# Patient Record
Sex: MALE | Race: WHITE | NOT HISPANIC OR LATINO | URBAN - METROPOLITAN AREA
[De-identification: names, ages, dates, MRNs, and addresses within clinical notes are randomized per-mention and may not be internally consistent; named-entity substitution may affect disease eponyms.]

---

## 2017-12-20 ENCOUNTER — APPOINTMENT (RX ONLY)
Dept: URBAN - METROPOLITAN AREA CLINIC 142 | Facility: CLINIC | Age: 82
Setting detail: DERMATOLOGY
End: 2017-12-20

## 2017-12-20 DIAGNOSIS — L82.1 OTHER SEBORRHEIC KERATOSIS: ICD-10-CM

## 2017-12-20 DIAGNOSIS — D18.0 HEMANGIOMA: ICD-10-CM

## 2017-12-20 DIAGNOSIS — Z85.828 PERSONAL HISTORY OF OTHER MALIGNANT NEOPLASM OF SKIN: ICD-10-CM

## 2017-12-20 DIAGNOSIS — L57.0 ACTINIC KERATOSIS: ICD-10-CM

## 2017-12-20 PROBLEM — E78.5 HYPERLIPIDEMIA, UNSPECIFIED: Status: ACTIVE | Noted: 2017-12-20

## 2017-12-20 PROBLEM — D48.5 NEOPLASM OF UNCERTAIN BEHAVIOR OF SKIN: Status: ACTIVE | Noted: 2017-12-20

## 2017-12-20 PROBLEM — I10 ESSENTIAL (PRIMARY) HYPERTENSION: Status: ACTIVE | Noted: 2017-12-20

## 2017-12-20 PROBLEM — I48.91 UNSPECIFIED ATRIAL FIBRILLATION: Status: ACTIVE | Noted: 2017-12-20

## 2017-12-20 PROBLEM — D18.01 HEMANGIOMA OF SKIN AND SUBCUTANEOUS TISSUE: Status: ACTIVE | Noted: 2017-12-20

## 2017-12-20 PROBLEM — I63.50 CEREBRAL INFARCTION DUE TO UNSPECIFIED OCCLUSION OR STENOSIS OF UNSPECIFIED CEREBRAL ARTERY: Status: ACTIVE | Noted: 2017-12-20

## 2017-12-20 PROCEDURE — ? BIOPSY BY SHAVE METHOD

## 2017-12-20 PROCEDURE — ? LIQUID NITROGEN

## 2017-12-20 PROCEDURE — 99203 OFFICE O/P NEW LOW 30 MIN: CPT | Mod: 25

## 2017-12-20 PROCEDURE — 11100: CPT | Mod: 59

## 2017-12-20 PROCEDURE — ? COUNSELING

## 2017-12-20 PROCEDURE — 17003 DESTRUCT PREMALG LES 2-14: CPT

## 2017-12-20 PROCEDURE — 17000 DESTRUCT PREMALG LESION: CPT

## 2017-12-20 ASSESSMENT — LOCATION ZONE DERM
LOCATION ZONE: FACE
LOCATION ZONE: NOSE
LOCATION ZONE: TRUNK
LOCATION ZONE: SCALP

## 2017-12-20 ASSESSMENT — LOCATION DETAILED DESCRIPTION DERM
LOCATION DETAILED: RIGHT CENTRAL PARIETAL SCALP
LOCATION DETAILED: POSTERIOR MID-PARIETAL SCALP
LOCATION DETAILED: RIGHT MID-UPPER BACK
LOCATION DETAILED: RIGHT MEDIAL MALAR CHEEK
LOCATION DETAILED: NASAL DORSUM
LOCATION DETAILED: LEFT LATERAL ZYGOMA
LOCATION DETAILED: LEFT SUPERIOR UPPER BACK

## 2017-12-20 ASSESSMENT — LOCATION SIMPLE DESCRIPTION DERM
LOCATION SIMPLE: LEFT ZYGOMA
LOCATION SIMPLE: LEFT UPPER BACK
LOCATION SIMPLE: SCALP
LOCATION SIMPLE: RIGHT UPPER BACK
LOCATION SIMPLE: POSTERIOR SCALP
LOCATION SIMPLE: NOSE
LOCATION SIMPLE: RIGHT CHEEK

## 2017-12-20 ASSESSMENT — PAIN INTENSITY VAS: HOW INTENSE IS YOUR PAIN 0 BEING NO PAIN, 10 BEING THE MOST SEVERE PAIN POSSIBLE?: NO PAIN

## 2017-12-20 NOTE — HPI: NON-MELANOMA SKIN CANCER F/U (HISTORY OF NMSC)
What Is The Reason For Today's Visit?: Surveillance against skin cancer recurrences
How Many Skin Cancers Have You Had?: more than one
When Was Your Last Cancer Diagnosed?: 2004

## 2017-12-20 NOTE — PROCEDURE: BIOPSY BY SHAVE METHOD
Cryotherapy Text: The wound bed was treated with cryotherapy after the biopsy was performed.
Wound Care: Vaseline
Post-Care Instructions: I reviewed with the patient in detail post-care instructions. Patient is to keep the biopsy site dry overnight, and then apply vaseline twice daily until healed. Patient may apply water soaks to remove any crusting.
Curettage Text: The wound bed was treated with curettage after the biopsy was performed.
Hemostasis: Drysol
Type Of Destruction Used: Curettage
Biopsy Type: H and E
Lab Facility: [id number]
Electrodesiccation Text: The wound bed was treated with electrodesiccation after the biopsy was performed.
Bill [id number] For Specimen Handling/Conveyance To Laboratory?: no
Consent: Verbal consent was obtained and risks were reviewed including but not limited to scarring, infection, bleeding, scabbing, incomplete removal, nerve damage and allergy to anesthesia.
Detail Level: Detailed
Notification Instructions: Patient will be notified of biopsy results. However, patient instructed to call the office if not contacted within 4 weeks.
Anesthesia Volume In Cc (Will Not Render If 0): 1
Silver Nitrate Text: The wound bed was treated with silver nitrate after the biopsy was performed.
X Size Of Lesion In Cm: 0
Anesthesia Type: 1% lidocaine without epinephrine
Electrodesiccation And Curettage Text: The wound bed was treated with electrodesiccation and curettage after the biopsy was performed.
Dressing: pressure dressing with telfa
Lab: [id number]
Biopsy Method: Dermablade
Billing Type: Third-Party Bill
Render Post-Care Instructions In Note?: yes

## 2017-12-20 NOTE — PROCEDURE: LIQUID NITROGEN
Total Number Of Aks Treated: 3
Render Post-Care Instructions In Note?: no
Detail Level: Detailed
Post-Care Instructions: I reviewed with the patient in detail post-care instructions. Patient is to wear sunprotection, and avoid picking at any of the treated lesions. Pt may apply Vaseline to crusted or scabbing areas.
Consent: The patient's consent was obtained including but not limited to risks of crusting, scabbing, blistering, scarring, darker or lighter pigmentary change, recurrence, incomplete removal and infection.
Duration Of Freeze Thaw-Cycle (Seconds): 0

## 2018-01-29 ENCOUNTER — APPOINTMENT (RX ONLY)
Dept: URBAN - METROPOLITAN AREA SURGERY CENTER 11 | Facility: SURGERY CENTER | Age: 83
Setting detail: DERMATOLOGY
End: 2018-01-29

## 2018-01-29 VITALS
HEART RATE: 75 BPM | OXYGEN SATURATION: 98 % | DIASTOLIC BLOOD PRESSURE: 84 MMHG | SYSTOLIC BLOOD PRESSURE: 152 MMHG | WEIGHT: 140 LBS | HEIGHT: 66 IN

## 2018-01-29 PROBLEM — D04.39 CARCINOMA IN SITU OF SKIN OF OTHER PARTS OF FACE: Status: ACTIVE | Noted: 2018-01-29

## 2018-01-29 PROCEDURE — 17312 MOHS ADDL STAGE: CPT

## 2018-01-29 PROCEDURE — 13132 CMPLX RPR F/C/C/M/N/AX/G/H/F: CPT

## 2018-01-29 PROCEDURE — ? MOHS SURGERY

## 2018-01-29 PROCEDURE — ? PRESCRIPTION

## 2018-01-29 PROCEDURE — 17311 MOHS 1 STAGE H/N/HF/G: CPT

## 2018-01-29 RX ORDER — CEPHALEXIN 250 MG/1
CAPSULE ORAL
Qty: 28 | Refills: 0 | Status: ERX | COMMUNITY
Start: 2018-01-29

## 2018-01-29 RX ADMIN — CEPHALEXIN: 250 CAPSULE ORAL at 14:56

## 2018-01-29 NOTE — PROCEDURE: MOHS SURGERY
Simple / Intermediate / Complex Repair - Final Wound Length In Cm: 4.2
Ftsg Text: The defect edges were debeveled with a #15 scalpel blade.  Given the location of the defect, shape of the defect and the proximity to free margins a full thickness skin graft was deemed most appropriate.  Using a sterile surgical marker, the primary defect shape was transferred to the donor site. The area thus outlined was incised deep to adipose tissue with a #15 scalpel blade.  The harvested graft was then trimmed of adipose tissue until only dermis and epidermis was left.  The skin margins of the secondary defect were undermined to an appropriate distance in all directions utilizing iris scissors.  The secondary defect was closed with interrupted buried subcutaneous sutures.  The skin edges were then re-apposed with running  sutures.  The skin graft was then placed in the primary defect and oriented appropriately.
Helical Rim Advancement Flap Text: The defect edges were debeveled with a #15 blade scalpel.  Given the location of the defect and the proximity to free margins (helical rim) a double helical rim advancement flap was deemed most appropriate.  Using a sterile surgical marker, the appropriate advancement flaps were drawn incorporating the defect and placing the expected incisions between the helical rim and antihelix where possible.  The area thus outlined was incised through and through with a #15 scalpel blade.  With a skin hook and iris scissors, the flaps were gently and sharply undermined and freed up.
Consent Type: Consent 1 (Standard)
Asa Classification: II
Depth Of Tumor Invasion (For Histology): epidermis
Stage 6: Additional Anesthesia Volume In Cc: 0
Anesthesia Type: 1% lidocaine with 1:100,000 epinephrine and a 1:10 solution of 8.4% sodium bicarbonate
Use Subsequent Stages Verbiage?: Yes
Stage 8: Additional Anesthesia Type: 1% lidocaine with epinephrine
Bill For Surgical Tray: no
Rhombic Flap Text: The defect edges were debeveled with a #15 scalpel blade.  Given the location of the defect and the proximity to free margins a rhombic flap was deemed most appropriate.  Using a sterile surgical marker, an appropriate rhombic flap was drawn incorporating the defect.    The area thus outlined was incised deep to adipose tissue with a #15 scalpel blade.  The skin margins were undermined to an appropriate distance in all directions utilizing iris scissors.
Trilobed Flap Text: The defect edges were debeveled with a #15 scalpel blade.  Given the location of the defect and the proximity to free margins a trilobed flap was deemed most appropriate.  Using a sterile surgical marker, an appropriate trilobed flap drawn around the defect.    The area thus outlined was incised deep to adipose tissue with a #15 scalpel blade.  The skin margins were undermined to an appropriate distance in all directions utilizing iris scissors.
Alar Island Pedicle Flap Text: The defect edges were debeveled with a #15 scalpel blade.  Given the location of the defect, shape of the defect and the proximity to the alar rim an island pedicle advancement flap was deemed most appropriate.  Using a sterile surgical marker, an appropriate advancement flap was drawn incorporating the defect, outlining the appropriate donor tissue and placing the expected incisions within the nasal ala running parallel to the alar rim. The area thus outlined was incised with a #15 scalpel blade.  The skin margins were undermined minimally to an appropriate distance in all directions around the primary defect and laterally outward around the island pedicle utilizing iris scissors.  There was minimal undermining beneath the pedicle flap.
Surgeon/Pathologist Verbiage (Will Incorporate Name Of Surgeon From Intro If Not Blank): operated in two distinct and integrated capacities as the surgeon and pathologist.
Purse String (Simple) Text: Given the location of the defect and the characteristics of the surrounding skin a pursestring closure was deemed most appropriate.  Undermining was performed circumfirentially around the surgical defect.  A purstring suture was then placed and tightened.
Tarsorrhaphy Text: A tarsorrhaphy was performed using Frost sutures.
W Plasty Text: The lesion was extirpated to the level of the fat with a #15 scalpel blade.  Given the location of the defect, shape of the defect and the proximity to free margins a W-plasty was deemed most appropriate for repair.  Using a sterile surgical marker, the appropriate transposition arms of the W-plasty were drawn incorporating the defect and placing the expected incisions within the relaxed skin tension lines where possible.    The area thus outlined was incised deep to adipose tissue with a #15 scalpel blade.  The skin margins were undermined to an appropriate distance in all directions utilizing iris scissors.  The opposing transposition arms were then transposed into place in opposite direction and anchored with interrupted buried subcutaneous sutures.
Melolabial Interpolation Flap Text: A decision was made to reconstruct the defect utilizing an interpolation axial flap and a staged reconstruction.  A telfa template was made of the defect.  This telfa template was then used to outline the melolabial interpolation flap.  The donor area for the pedicle flap was then injected with anesthesia.  The flap was excised through the skin and subcutaneous tissue down to the layer of the underlying musculature.  The pedicle flap was carefully excised within this deep plane to maintain its blood supply.  The edges of the donor site were undermined.   The donor site was closed in a primary fashion.  The pedicle was then rotated into position and sutured.  Once the tube was sutured into place, adequate blood supply was confirmed with blanching and refill.  The pedicle was then wrapped with xeroform gauze and dressed appropriately with a telfa and gauze bandage to ensure continued blood supply and protect the attached pedicle.
Repair Performed By Another Provider Text (Leave Blank If You Do Not Want): After obtaining clear surgical margins the defect was repaired by another provider.
Consent 3/Introductory Paragraph: I gave the patient a chance to ask questions they had about the procedure.  Following this I explained the Mohs procedure and consent was obtained. The risks, benefits and alternatives to therapy were discussed in detail. Specifically, the risks of infection, scarring, bleeding, prolonged wound healing, incomplete removal, allergy to anesthesia, nerve injury and recurrence were addressed. Prior to the procedure, the treatment site was clearly identified and confirmed by the patient. All components of Universal Protocol/PAUSE Rule completed.
No Repair - Repaired With Adjacent Surgical Defect Text (Leave Blank If You Do Not Want): After obtaining clear surgical margins the defect was repaired concurrently with another surgical defect which was in close approximation.
Dermal Autograft Text: The defect edges were debeveled with a #15 scalpel blade.  Given the location of the defect, shape of the defect and the proximity to free margins a dermal autograft was deemed most appropriate.  Using a sterile surgical marker, the primary defect shape was transferred to the donor site. The area thus outlined was incised deep to adipose tissue with a #15 scalpel blade.  The harvested graft was then trimmed of adipose and epidermal tissue until only dermis was left.  The skin graft was then placed in the primary defect and oriented appropriately.
Number Of Stages: 2
Wound Care: Vaseline
Lazy S Intermediate Repair Preamble Text (Leave Blank If You Do Not Want): Undermining was performed with blunt dissection.
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
Consent (Nose)/Introductory Paragraph: The rationale for Mohs was explained to the patient and consent was obtained. The risks, benefits and alternatives to therapy were discussed in detail. Specifically, the risks of nasal deformity, changes in the flow of air through the nose, infection, scarring, bleeding, prolonged wound healing, incomplete removal, allergy to anesthesia, nerve injury and recurrence were addressed. Prior to the procedure, the treatment site was clearly identified and confirmed by the patient. All components of Universal Protocol/PAUSE Rule completed.
Consent (Scalp)/Introductory Paragraph: The rationale for Mohs was explained to the patient and consent was obtained. The risks, benefits and alternatives to therapy were discussed in detail. Specifically, the risks of changes in hair growth pattern secondary to repair, infection, scarring, bleeding, prolonged wound healing, incomplete removal, allergy to anesthesia, nerve injury and recurrence were addressed. Prior to the procedure, the treatment site was clearly identified and confirmed by the patient. All components of Universal Protocol/PAUSE Rule completed.
Island Pedicle Flap Text: The defect edges were debeveled with a #15 scalpel blade.  Given the location of the defect, shape of the defect and the proximity to free margins an island pedicle advancement flap was deemed most appropriate.  Using a sterile surgical marker, an appropriate advancement flap was drawn incorporating the defect, outlining the appropriate donor tissue and placing the expected incisions within the relaxed skin tension lines where possible.    The area thus outlined was incised deep to adipose tissue with a #15 scalpel blade.  The skin margins were undermined to an appropriate distance in all directions around the primary defect and laterally outward around the island pedicle utilizing iris scissors.  There was minimal undermining beneath the pedicle flap.
Time Everyone Was In The Room/Time Out: 3040
Spiral Flap Text: The defect edges were debeveled with a #15 scalpel blade.  Given the location of the defect, shape of the defect and the proximity to free margins a spiral flap was deemed most appropriate.  Using a sterile surgical marker, an appropriate rotation flap was drawn incorporating the defect and placing the expected incisions within the relaxed skin tension lines where possible. The area thus outlined was incised deep to adipose tissue with a #15 scalpel blade.  The skin margins were undermined to an appropriate distance in all directions utilizing iris scissors.
Paramedian Forehead Flap Text: A decision was made to reconstruct the defect utilizing an interpolation axial flap and a staged reconstruction.  A telfa template was made of the defect.  This telfa template was then used to outline the paramedian forehead pedicle flap.  The donor area for the pedicle flap was then injected with anesthesia.  The flap was excised through the skin and subcutaneous tissue down to the layer of the underlying musculature.  The pedicle flap was carefully excised within this deep plane to maintain its blood supply.  The edges of the donor site were undermined.   The donor site was closed in a primary fashion.  The pedicle was then rotated into position and sutured.  Once the tube was sutured into place, adequate blood supply was confirmed with blanching and refill.  The pedicle was then wrapped with xeroform gauze and dressed appropriately with a telfa and gauze bandage to ensure continued blood supply and protect the attached pedicle.
V-Y Flap Text: The defect edges were debeveled with a #15 scalpel blade.  Given the location of the defect, shape of the defect and the proximity to free margins a V-Y flap was deemed most appropriate.  Using a sterile surgical marker, an appropriate advancement flap was drawn incorporating the defect and placing the expected incisions within the relaxed skin tension lines where possible.    The area thus outlined was incised deep to adipose tissue with a #15 scalpel blade.  The skin margins were undermined to an appropriate distance in all directions utilizing iris scissors.
Cheek Interpolation Flap Text: A decision was made to reconstruct the defect utilizing an interpolation axial flap and a staged reconstruction.  A telfa template was made of the defect.  This telfa template was then used to outline the Cheek Interpolation flap.  The donor area for the pedicle flap was then injected with anesthesia.  The flap was excised through the skin and subcutaneous tissue down to the layer of the underlying musculature.  The interpolation flap was carefully excised within this deep plane to maintain its blood supply.  The edges of the donor site were undermined.   The donor site was closed in a primary fashion.  The pedicle was then rotated into position and sutured.  Once the tube was sutured into place, adequate blood supply was confirmed with blanching and refill.  The pedicle was then wrapped with xeroform gauze and dressed appropriately with a telfa and gauze bandage to ensure continued blood supply and protect the attached pedicle.
Closure 4 Information: This tab is for additional flaps and grafts above and beyond our usual structured repairs.  Please note if you enter information here it will not currently bill and you will need to add the billing information manually.
Eye Protection Verbiage: Before proceeding with the stage, a plastic scleral shield was inserted. The globe was anesthetized with a few drops of 1% lidocaine with 1:100,000 epinephrine. Then, an appropriate sized scleral shield was chosen and coated with lacrilube ointment. The shield was gently inserted and left in place for the duration of each stage. After the stage was completed, the shield was gently removed.
Anesthesia Volume In Cc: 2.5
S Plasty Text: Given the location and shape of the defect, and the orientation of relaxed skin tension lines, an S-plasty was deemed most appropriate for repair.  Using a sterile surgical marker, the appropriate outline of the S-plasty was drawn, incorporating the defect and placing the expected incisions within the relaxed skin tension lines where possible.  The area thus outlined was incised deep to adipose tissue with a #15 scalpel blade.  The skin margins were undermined to an appropriate distance in all directions utilizing iris scissors. The skin flaps were advanced over the defect.  The opposing margins were then approximated with interrupted buried subcutaneous sutures.
Stage 1: Number Of Blocks?: 1
Time Patient Discharged: 0242
Complex Repair And Graft Additional Text (Will Appearing After The Standard Complex Repair Text): The complex repair was not sufficient to completely close the primary defect. The remaining additional defect was repaired with the graft mentioned below.
Lazy S Complex Repair Preamble Text (Leave Blank If You Do Not Want): Extensive wide undermining was performed.
Cartilage Graft Text: The defect edges were debeveled with a #15 scalpel blade.  Given the location of the defect, shape of the defect, the fact the defect involved a full thickness cartilage defect a cartilage graft was deemed most appropriate.  An appropriate donor site was identified, cleansed, and anesthetized. The cartilage graft was then harvested and transferred to the recipient site, oriented appropriately and then sutured into place.  The secondary defect was then repaired using a primary closure.
Burow's Advancement Flap Text: The defect edges were debeveled with a #15 scalpel blade.  Given the location of the defect and the proximity to free margins a Burow's advancement flap was deemed most appropriate.  Using a sterile surgical marker, the appropriate advancement flap was drawn incorporating the defect and placing the expected incisions within the relaxed skin tension lines where possible.    The area thus outlined was incised deep to adipose tissue with a #15 scalpel blade.  The skin margins were undermined to an appropriate distance in all directions utilizing iris scissors.
Island Pedicle Flap-Requiring Vessel Identification Text: The defect edges were debeveled with a #15 scalpel blade.  Given the location of the defect, shape of the defect and the proximity to free margins an island pedicle advancement flap was deemed most appropriate.  Using a sterile surgical marker, an appropriate advancement flap was drawn, based on the axial vessel mentioned above, incorporating the defect, outlining the appropriate donor tissue and placing the expected incisions within the relaxed skin tension lines where possible.    The area thus outlined was incised deep to adipose tissue with a #15 scalpel blade.  The skin margins were undermined to an appropriate distance in all directions around the primary defect and laterally outward around the island pedicle utilizing iris scissors.  There was minimal undermining beneath the pedicle flap.
Epidermal Sutures: 6-0 Ethilon
Transposition Flap Text: The defect edges were debeveled with a #15 scalpel blade.  Given the location of the defect and the proximity to free margins a transposition flap was deemed most appropriate.  Using a sterile surgical marker, an appropriate transposition flap was drawn incorporating the defect.    The area thus outlined was incised deep to adipose tissue with a #15 scalpel blade.  The skin margins were undermined to an appropriate distance in all directions utilizing iris scissors.
Perineural Invasion (For Histology - Be Specific If Possible): absent
Hemostasis: Electrocautery
O-L Flap Text: The defect edges were debeveled with a #15 scalpel blade.  Given the location of the defect, shape of the defect and the proximity to free margins an O-L flap was deemed most appropriate.  Using a sterile surgical marker, an appropriate advancement flap was drawn incorporating the defect and placing the expected incisions within the relaxed skin tension lines where possible.    The area thus outlined was incised deep to adipose tissue with a #15 scalpel blade.  The skin margins were undermined to an appropriate distance in all directions utilizing iris scissors.
Time Procedure Ended: 2732
Donor Site Anesthesia Type: same as repair anesthesia
Composite Graft Text: The defect edges were debeveled with a #15 scalpel blade.  Given the location of the defect, shape of the defect, the proximity to free margins and the fact the defect was full thickness a composite graft was deemed most appropriate.  The defect was outline and then transferred to the donor site.  A full thickness graft was then excised from the donor site. The graft was then placed in the primary defect, oriented appropriately and then sutured into place.  The secondary defect was then repaired using a primary closure.
Referred To Otolaryngology For Closure Text (Leave Blank If You Do Not Want): After obtaining clear surgical margins the patient was sent to otolaryngology for surgical repair.  The patient understands they will receive post-surgical care and follow-up from the referring physician's office.
Dressing: pressure dressing with telfa
Island Pedicle Flap With Canthal Suspension Text: The defect edges were debeveled with a #15 scalpel blade.  Given the location of the defect, shape of the defect and the proximity to free margins an island pedicle advancement flap was deemed most appropriate.  Using a sterile surgical marker, an appropriate advancement flap was drawn incorporating the defect, outlining the appropriate donor tissue and placing the expected incisions within the relaxed skin tension lines where possible. The area thus outlined was incised deep to adipose tissue with a #15 scalpel blade.  The skin margins were undermined to an appropriate distance in all directions around the primary defect and laterally outward around the island pedicle utilizing iris scissors.  There was minimal undermining beneath the pedicle flap. A suspension suture was placed in the canthal tendon to prevent tension and prevent ectropion.
Suture Removal: 7 days
Interpolation Flap Text: A decision was made to reconstruct the defect utilizing an interpolation axial flap and a staged reconstruction.  A telfa template was made of the defect.  This telfa template was then used to outline the interpolation flap.  The donor area for the pedicle flap was then injected with anesthesia.  The flap was excised through the skin and subcutaneous tissue down to the layer of the underlying musculature.  The interpolation flap was carefully excised within this deep plane to maintain its blood supply.  The edges of the donor site were undermined.   The donor site was closed in a primary fashion.  The pedicle was then rotated into position and sutured.  Once the tube was sutured into place, adequate blood supply was confirmed with blanching and refill.  The pedicle was then wrapped with xeroform gauze and dressed appropriately with a telfa and gauze bandage to ensure continued blood supply and protect the attached pedicle.
Modified Advancement Flap Text: The defect edges were debeveled with a #15 scalpel blade.  Given the location of the defect, shape of the defect and the proximity to free margins a modified advancement flap was deemed most appropriate.  Using a sterile surgical marker, an appropriate advancement flap was drawn incorporating the defect and placing the expected incisions within the relaxed skin tension lines where possible.    The area thus outlined was incised deep to adipose tissue with a #15 scalpel blade.  The skin margins were undermined to an appropriate distance in all directions utilizing iris scissors.
Partial Purse String (Simple) Text: Given the location of the defect and the characteristics of the surrounding skin a simple purse string closure was deemed most appropriate.  Undermining was performed circumfirentially around the surgical defect.  A purse string suture was then placed and tightened. Wound tension only allowed a partial closure of the circular defect.
Bilobed Flap Text: The defect edges were debeveled with a #15 scalpel blade.  Given the location of the defect and the proximity to free margins a bilobe flap was deemed most appropriate.  Using a sterile surgical marker, an appropriate bilobe flap drawn around the defect.    The area thus outlined was incised deep to adipose tissue with a #15 scalpel blade.  The skin margins were undermined to an appropriate distance in all directions utilizing iris scissors.
Detail Level: Detailed
Closure 2 Information: This tab is for additional flaps and grafts, including complex repair and grafts and complex repair and flaps. You can also specify a different location for the additional defect, if the location is the same you do not need to select a new one. We will insert the automated text for the repair you select below just as we do for solitary flaps and grafts. Please note that at this time if you select a location with a different insurance zone you will need to override the ICD10 and CPT if appropriate.
Consent (Marginal Mandibular)/Introductory Paragraph: The rationale for Mohs was explained to the patient and consent was obtained. The risks, benefits and alternatives to therapy were discussed in detail. Specifically, the risks of damage to the marginal mandibular branch of the facial nerve, infection, scarring, bleeding, prolonged wound healing, incomplete removal, allergy to anesthesia, and recurrence were addressed. Prior to the procedure, the treatment site was clearly identified and confirmed by the patient. All components of Universal Protocol/PAUSE Rule completed.
Area L Indication Text: Tumors in this location are included in Area L (trunk and extremities).  Mohs surgery is indicated for larger tumors, or tumors with aggressive histologic features, in these anatomic locations.
A-T Advancement Flap Text: The defect edges were debeveled with a #15 scalpel blade.  Given the location of the defect, shape of the defect and the proximity to free margins an A-T advancement flap was deemed most appropriate.  Using a sterile surgical marker, an appropriate advancement flap was drawn incorporating the defect and placing the expected incisions within the relaxed skin tension lines where possible.    The area thus outlined was incised deep to adipose tissue with a #15 scalpel blade.  The skin margins were undermined to an appropriate distance in all directions utilizing iris scissors.
Alternatives Discussed Intro (Do Not Add Period): I discussed alternative treatments to Mohs surgery and specifically discussed the risks and benefits of
Repair Type: Complex Repair
Subsequent Stages Histo Method Verbiage: Using a similar technique to that described above, a thin layer of tissue was removed from all areas where tumor was visible on the previous stage.  The tissue was again oriented, mapped, dyed, and processed as above.
Estimated Blood Loss (Cc): minimal
O-T Plasty Text: The defect edges were debeveled with a #15 scalpel blade.  Given the location of the defect, shape of the defect and the proximity to free margins an O-T plasty was deemed most appropriate.  Using a sterile surgical marker, an appropriate O-T plasty was drawn incorporating the defect and placing the expected incisions within the relaxed skin tension lines where possible.    The area thus outlined was incised deep to adipose tissue with a #15 scalpel blade.  The skin margins were undermined to an appropriate distance in all directions utilizing iris scissors.
Referred To Asc For Closure Text (Leave Blank If You Do Not Want): After obtaining clear surgical margins the patient was sent to an ASC for surgical repair.  The patient understands they will receive post-surgical care and follow-up from the ASC physician.
Initial Size Of Lesion: 1.3
Ear Wedge Repair Text: A wedge excision was completed by carrying down an excision through the full thickness of the ear and cartilage with an inward facing Burow's triangle. The wound was then closed in a layered fashion.
H Plasty Text: Given the location of the defect, shape of the defect and the proximity to free margins a H-plasty was deemed most appropriate for repair.  Using a sterile surgical marker, the appropriate advancement arms of the H-plasty were drawn incorporating the defect and placing the expected incisions within the relaxed skin tension lines where possible. The area thus outlined was incised deep to adipose tissue with a #15 scalpel blade. The skin margins were undermined to an appropriate distance in all directions utilizing iris scissors.  The opposing advancement arms were then advanced into place in opposite direction and anchored with interrupted buried subcutaneous sutures.
Area H Indication Text: Tumors in this location are included in Area H (eyelids, eyebrows, nose, lips, chin, ear, pre-auricular, post-auricular, temple, genitalia, hands, feet, ankles and areola).  Tissue conservation is critical in these anatomic locations.
Hatchet Flap Text: The defect edges were debeveled with a #15 scalpel blade.  Given the location of the defect, shape of the defect and the proximity to free margins a hatchet flap was deemed most appropriate.  Using a sterile surgical marker, an appropriate hatchet flap was drawn incorporating the defect and placing the expected incisions within the relaxed skin tension lines where possible.    The area thus outlined was incised deep to adipose tissue with a #15 scalpel blade.  The skin margins were undermined to an appropriate distance in all directions utilizing iris scissors.
Mohs Method Verbiage: An incision at a 45 degree angle following the standard Mohs approach was done and the specimen was harvested as a microscopic controlled layer.
Localized Dermabrasion Text: The patient was draped in routine manner.  Localized dermabrasion using 3 x 17 mm wire brush was performed in routine manner to papillary dermis. This spot dermabrasion is being performed to complete skin cancer reconstruction. It also will eliminate the other sun damaged precancerous cells that are known to be part of the regional effect of a lifetime's worth of sun exposure. This localized dermabrasion is therapeutic and should not be considered cosmetic in any regard.
Purse String (Intermediate) Text: Given the location of the defect and the characteristics of the surrounding skin a pursestring intermediate closure was deemed most appropriate.  Undermining was performed circumfirentially around the surgical defect.  A purstring suture was then placed and tightened.
Mastoid Interpolation Flap Text: A decision was made to reconstruct the defect utilizing an interpolation axial flap and a staged reconstruction.  A telfa template was made of the defect.  This telfa template was then used to outline the mastoid interpolation flap.  The donor area for the pedicle flap was then injected with anesthesia.  The flap was excised through the skin and subcutaneous tissue down to the layer of the underlying musculature.  The pedicle flap was carefully excised within this deep plane to maintain its blood supply.  The edges of the donor site were undermined.   The donor site was closed in a primary fashion.  The pedicle was then rotated into position and sutured.  Once the tube was sutured into place, adequate blood supply was confirmed with blanching and refill.  The pedicle was then wrapped with xeroform gauze and dressed appropriately with a telfa and gauze bandage to ensure continued blood supply and protect the attached pedicle.
Cheiloplasty (Less Than 50%) Text: A decision was made to reconstruct the defect with a  cheiloplasty.  The defect was undermined extensively.  Additional obicularis oris muscle was excised with a 15 blade scalpel.  The defect was converted into a full thickness wedge, of less than 50% of the vertical height of the lip, to facilite a better cosmetic result.  Small vessels were then tied off with 5-0 monocyrl. The obicularis oris, superficial fascia, adipose and dermis were then reapproximated.  After the deeper layers were approximated the epidermis was reapproximated with particular care given to realign the vermillion border.
Secondary Intention Text (Leave Blank If You Do Not Want): The defect will heal with secondary intention.
Graft Donor Site Bandage (Optional-Leave Blank If You Don't Want In Note): Steri-strips and a pressure bandage were applied to the donor site.
Complex Repair Preamble Text (Leave Blank If You Do Not Want): The defect edges were debeveled with a #15 scalpel blade. Given the location of the defect a complex repair was deemed most appropriate.  Using a sterile surgical marker, burrow triangles were drawn incorporating the defect and placing the expected incisions within the relaxed skin tension lines where possible.    The area thus outlined was incised to the appropriate tissue plane with a scalpel blade. Extensive wide undermining was performed in all directions to allow proper closure of the defect.  Hemostasis was obtained by cautery.
Consent 2/Introductory Paragraph: Mohs surgery was explained to the patient and consent was obtained. The risks, benefits and alternatives to therapy were discussed in detail. Specifically, the risks of infection, scarring, bleeding, prolonged wound healing, incomplete removal, allergy to anesthesia, nerve injury and recurrence were addressed. Prior to the procedure, the treatment site was clearly identified and confirmed by the patient. All components of Universal Protocol/PAUSE Rule completed.
Consent (Near Eyelid Margin)/Introductory Paragraph: The rationale for Mohs was explained to the patient and consent was obtained. The risks, benefits and alternatives to therapy were discussed in detail. Specifically, the risks of ectropion or eyelid deformity, infection, scarring, bleeding, prolonged wound healing, incomplete removal, allergy to anesthesia, nerve injury and recurrence were addressed. Prior to the procedure, the treatment site was clearly identified and confirmed by the patient. All components of Universal Protocol/PAUSE Rule completed.
Bi-Rhombic Flap Text: The defect edges were debeveled with a #15 scalpel blade.  Given the location of the defect and the proximity to free margins a bi-rhombic flap was deemed most appropriate.  Using a sterile surgical marker, an appropriate rhombic flap was drawn incorporating the defect. The area thus outlined was incised deep to adipose tissue with a #15 scalpel blade.  The skin margins were undermined to an appropriate distance in all directions utilizing iris scissors.
Rotation Flap Text: The defect edges were debeveled with a #15 scalpel blade.  Given the location of the defect, shape of the defect and the proximity to free margins a rotation flap was deemed most appropriate.  Using a sterile surgical marker, an appropriate rotation flap was drawn incorporating the defect and placing the expected incisions within the relaxed skin tension lines where possible.    The area thus outlined was incised deep to adipose tissue with a #15 scalpel blade.  The skin margins were undermined to an appropriate distance in all directions utilizing iris scissors.
Consent (Lip)/Introductory Paragraph: The rationale for Mohs was explained to the patient and consent was obtained. The risks, benefits and alternatives to therapy were discussed in detail. Specifically, the risks of lip deformity, changes in the oral aperture, infection, scarring, bleeding, prolonged wound healing, incomplete removal, allergy to anesthesia, nerve injury and recurrence were addressed. Prior to the procedure, the treatment site was clearly identified and confirmed by the patient. All components of Universal Protocol/PAUSE Rule completed.
X Size Of Lesion In Cm (Optional): 1.1
Advancement Flap (Single) Text: The defect edges were debeveled with a #15 scalpel blade.  Given the location of the defect and the proximity to free margins a single advancement flap was deemed most appropriate.  Using a sterile surgical marker, an appropriate advancement flap was drawn incorporating the defect and placing the expected incisions within the relaxed skin tension lines where possible.    The area thus outlined was incised deep to adipose tissue with a #15 scalpel blade.  The skin margins were undermined to an appropriate distance in all directions utilizing iris scissors.
Mohs Histo Method Verbiage: Each section was then chromacoded and processed in the Mohs lab using the Mohs protocol and submitted for frozen section.
Z Plasty Text: The lesion was extirpated to the level of the fat with a #15 scalpel blade.  Given the location of the defect, shape of the defect and the proximity to free margins a Z-plasty was deemed most appropriate for repair.  Using a sterile surgical marker, the appropriate transposition arms of the Z-plasty were drawn incorporating the defect and placing the expected incisions within the relaxed skin tension lines where possible.    The area thus outlined was incised deep to adipose tissue with a #15 scalpel blade.  The skin margins were undermined to an appropriate distance in all directions utilizing iris scissors.  The opposing transposition arms were then transposed into place in opposite direction and anchored with interrupted buried subcutaneous sutures.
Xenograft Text: The defect edges were debeveled with a #15 scalpel blade.  Given the location of the defect, shape of the defect and the proximity to free margins a xenograft was deemed most appropriate.  The graft was then trimmed to fit the size of the defect.  The graft was then placed in the primary defect and oriented appropriately.
V-Y Plasty Text: The defect edges were debeveled with a #15 scalpel blade.  Given the location of the defect, shape of the defect and the proximity to free margins an V-Y advancement flap was deemed most appropriate.  Using a sterile surgical marker, an appropriate advancement flap was drawn incorporating the defect and placing the expected incisions within the relaxed skin tension lines where possible.    The area thus outlined was incised deep to adipose tissue with a #15 scalpel blade.  The skin margins were undermined to an appropriate distance in all directions utilizing iris scissors.
Dorsal Nasal Flap Text: The defect edges were debeveled with a #15 scalpel blade.  Given the location of the defect and the proximity to free margins a dorsal nasal flap was deemed most appropriate.  Using a sterile surgical marker, an appropriate dorsal nasal flap was drawn around the defect.    The area thus outlined was incised deep to adipose tissue with a #15 scalpel blade.  The skin margins were undermined to an appropriate distance in all directions utilizing iris scissors.
Epidermal Closure Graft Donor Site (Optional): simple interrupted
Split-Thickness Skin Graft Text: The defect edges were debeveled with a #15 scalpel blade.  Given the location of the defect, shape of the defect and the proximity to free margins a split thickness skin graft was deemed most appropriate.  Using a sterile surgical marker, the primary defect shape was transferred to the donor site. The split thickness graft was then harvested.  The skin graft was then placed in the primary defect and oriented appropriately.
Skin Substitute Text: The defect edges were debeveled with a #15 scalpel blade.  Given the location of the defect, shape of the defect and the proximity to free margins a skin substitute graft was deemed most appropriate.  The graft material was trimmed to fit the size of the defect. The graft was then placed in the primary defect and oriented appropriately.
Advancement-Rotation Flap Text: The defect edges were debeveled with a #15 scalpel blade.  Given the location of the defect, shape of the defect and the proximity to free margins an advancement-rotation flap was deemed most appropriate.  Using a sterile surgical marker, an appropriate flap was drawn incorporating the defect and placing the expected incisions within the relaxed skin tension lines where possible. The area thus outlined was incised deep to adipose tissue with a #15 scalpel blade.  The skin margins were undermined to an appropriate distance in all directions utilizing iris scissors.
Bilateral Helical Rim Advancement Flap Text: The defect edges were debeveled with a #15 blade scalpel.  Given the location of the defect and the proximity to free margins (helical rim) a bilateral helical rim advancement flap was deemed most appropriate.  Using a sterile surgical marker, the appropriate advancement flaps were drawn incorporating the defect and placing the expected incisions between the helical rim and antihelix where possible.  The area thus outlined was incised through and through with a #15 scalpel blade.  With a skin hook and iris scissors, the flaps were gently and sharply undermined and freed up.
Melolabial Transposition Flap Text: The defect edges were debeveled with a #15 scalpel blade.  Given the location of the defect and the proximity to free margins a melolabial flap was deemed most appropriate.  Using a sterile surgical marker, an appropriate melolabial transposition flap was drawn incorporating the defect.    The area thus outlined was incised deep to adipose tissue with a #15 scalpel blade.  The skin margins were undermined to an appropriate distance in all directions utilizing iris scissors.
Bilobed Transposition Flap Text: The defect edges were debeveled with a #15 scalpel blade.  Given the location of the defect and the proximity to free margins a bilobed transposition flap was deemed most appropriate.  Using a sterile surgical marker, an appropriate bilobe flap drawn around the defect.    The area thus outlined was incised deep to adipose tissue with a #15 scalpel blade.  The skin margins were undermined to an appropriate distance in all directions utilizing iris scissors.
Advancement Flap (Double) Text: The defect edges were debeveled with a #15 scalpel blade.  Given the location of the defect and the proximity to free margins a double advancement flap was deemed most appropriate.  Using a sterile surgical marker, the appropriate advancement flaps were drawn incorporating the defect and placing the expected incisions within the relaxed skin tension lines where possible.    The area thus outlined was incised deep to adipose tissue with a #15 scalpel blade.  The skin margins were undermined to an appropriate distance in all directions utilizing iris scissors.
O-Z Plasty Text: The defect edges were debeveled with a #15 scalpel blade.  Given the location of the defect, shape of the defect and the proximity to free margins an O-Z plasty (double transposition flap) was deemed most appropriate.  Using a sterile surgical marker, the appropriate transposition flaps were drawn incorporating the defect and placing the expected incisions within the relaxed skin tension lines where possible.    The area thus outlined was incised deep to adipose tissue with a #15 scalpel blade.  The skin margins were undermined to an appropriate distance in all directions utilizing iris scissors.  Hemostasis was achieved with electrocautery.  The flaps were then transposed into place, one clockwise and the other counterclockwise, and anchored with interrupted buried subcutaneous sutures.
Wound Care (No Sutures): Petrolatum
Dressing (No Sutures): dry sterile dressing
Primary Defect Width In Cm (Final Defect Size - Required For Flaps/Grafts): 1.8
Medical Necessity Statement: Based on my medical judgement, Mohs surgery is the most appropriate treatment for this cancer compared to other treatments.
Closure 3 Information: This tab is for additional flaps and grafts above and beyond our usual structured repairs.  Please note if you enter information here it will not currently bill and you will need to add the billing information manually.
Complex Repair And Flap Additional Text (Will Appearing After The Standard Complex Repair Text): The complex repair was not sufficient to completely close the primary defect. The remaining additional defect was repaired with the flap mentioned below.
Consent (Ear)/Introductory Paragraph: The rationale for Mohs was explained to the patient and consent was obtained. The risks, benefits and alternatives to therapy were discussed in detail. Specifically, the risks of ear deformity, infection, scarring, bleeding, prolonged wound healing, incomplete removal, allergy to anesthesia, nerve injury and recurrence were addressed. Prior to the procedure, the treatment site was clearly identified and confirmed by the patient. All components of Universal Protocol/PAUSE Rule completed.
O-T Advancement Flap Text: The defect edges were debeveled with a #15 scalpel blade.  Given the location of the defect, shape of the defect and the proximity to free margins an O-T advancement flap was deemed most appropriate.  Using a sterile surgical marker, an appropriate advancement flap was drawn incorporating the defect and placing the expected incisions within the relaxed skin tension lines where possible.    The area thus outlined was incised deep to adipose tissue with a #15 scalpel blade.  The skin margins were undermined to an appropriate distance in all directions utilizing iris scissors.
Referred To Plastics For Closure Text (Leave Blank If You Do Not Want): After obtaining clear surgical margins the patient was sent to plastics for surgical repair.  The patient understands they will receive post-surgical care and follow-up from the referring physician's office.
Deep Sutures: 4-0 Vicryl
Surgeon: Raymond Dalton MD
Star Wedge Flap Text: The defect edges were debeveled with a #15 scalpel blade.  Given the location of the defect, shape of the defect and the proximity to free margins a star wedge flap was deemed most appropriate.  Using a sterile surgical marker, an appropriate rotation flap was drawn incorporating the defect and placing the expected incisions within the relaxed skin tension lines where possible. The area thus outlined was incised deep to adipose tissue with a #15 scalpel blade.  The skin margins were undermined to an appropriate distance in all directions utilizing iris scissors.
Keystone Flap Text: The defect edges were debeveled with a #15 scalpel blade.  Given the location of the defect, shape of the defect a keystone flap was deemed most appropriate.  Using a sterile surgical marker, an appropriate keystone flap was drawn incorporating the defect, outlining the appropriate donor tissue and placing the expected incisions within the relaxed skin tension lines where possible. The area thus outlined was incised deep to adipose tissue with a #15 scalpel blade.  The skin margins were undermined to an appropriate distance in all directions around the primary defect and laterally outward around the flap utilizing iris scissors.
Anesthesia Volume In Cc: 4.5
Time Procedure Started: 0852
Mucosal Advancement Flap Text: Given the location of the defect, shape of the defect and the proximity to free margins a mucosal advancement flap was deemed most appropriate. Incisions were made with a 15 blade scalpel in the appropriate fashion along the cutaneous vermillion border and the mucosal lip. The remaining actinically damaged mucosal tissue was excised.  The mucosal advancement flap was then elevated to the gingival sulcus with care taken to preserve the neurovascular structures and advanced into the primary defect. Care was taken to ensure that precise realignment of the vermillion border was achieved.
Consent (Spinal Accessory)/Introductory Paragraph: The rationale for Mohs was explained to the patient and consent was obtained. The risks, benefits and alternatives to therapy were discussed in detail. Specifically, the risks of damage to the spinal accessory nerve, infection, scarring, bleeding, prolonged wound healing, incomplete removal, allergy to anesthesia, and recurrence were addressed. Prior to the procedure, the treatment site was clearly identified and confirmed by the patient. All components of Universal Protocol/PAUSE Rule completed.
Tumor Debulked?: curette
Posterior Auricular Interpolation Flap Text: A decision was made to reconstruct the defect utilizing an interpolation axial flap and a staged reconstruction.  A telfa template was made of the defect.  This telfa template was then used to outline the posterior auricular interpolation flap.  The donor area for the pedicle flap was then injected with anesthesia.  The flap was excised through the skin and subcutaneous tissue down to the layer of the underlying musculature.  The pedicle flap was carefully excised within this deep plane to maintain its blood supply.  The edges of the donor site were undermined.   The donor site was closed in a primary fashion.  The pedicle was then rotated into position and sutured.  Once the tube was sutured into place, adequate blood supply was confirmed with blanching and refill.  The pedicle was then wrapped with xeroform gauze and dressed appropriately with a telfa and gauze bandage to ensure continued blood supply and protect the attached pedicle.
Post-Care Instructions: I reviewed with the patient in detail post-care instructions. Patient is not to engage in any heavy lifting, exercise, or swimming for the next 7 days. Should the patient develop any fevers, chills, bleeding, severe pain patient will contact the office immediately.
Mauc Instructions: By selecting yes to the question below the MAUC number will be added into the note.  This will be calculated automatically based on the diagnosis chosen, the size entered, the body zone selected (H,M,L) and the specific indications you chose. You will also have the option to override the Mohs AUC if you disagree with the automatically calculated number and this option is found in the Case Summary tab.
Consent 1/Introductory Paragraph: The rationale for Mohs was explained to the patient and consent was obtained. The risks, benefits and alternatives to therapy were discussed in detail. Specifically, the risks of infection, scarring, bleeding, prolonged wound healing, incomplete removal, allergy to anesthesia, nerve injury and recurrence were addressed. Prior to the procedure, the treatment site was clearly identified and confirmed by the patient. All components of Universal Protocol/PAUSE Rule completed.
Full Thickness Lip Wedge Repair (Flap) Text: Given the location of the defect and the proximity to free margins a full thickness wedge repair was deemed most appropriate.  Using a sterile surgical marker, the appropriate repair was drawn incorporating the defect and placing the expected incisions perpendicular to the vermillion border.  The vermillion border was also meticulously outlined to ensure appropriate reapproximation during the repair.  The area thus outlined was incised through and through with a #15 scalpel blade.  The muscularis and dermis were reaproximated with deep sutures following hemostasis. Care was taken to realign the vermillion border before proceeding with the superficial closure.  Once the vermillion was realigned the superfical and mucosal closure was finished.
Epidermal Autograft Text: The defect edges were debeveled with a #15 scalpel blade.  Given the location of the defect, shape of the defect and the proximity to free margins an epidermal autograft was deemed most appropriate.  Using a sterile surgical marker, the primary defect shape was transferred to the donor site. The epidermal graft was then harvested.  The skin graft was then placed in the primary defect and oriented appropriately.
Ear Star Wedge Flap Text: The defect edges were debeveled with a #15 blade scalpel.  Given the location of the defect and the proximity to free margins (helical rim) an ear star wedge flap was deemed most appropriate.  Using a sterile surgical marker, the appropriate flap was drawn incorporating the defect and placing the expected incisions between the helical rim and antihelix where possible.  The area thus outlined was incised through and through with a #15 scalpel blade.
Referred To Oculoplastics For Closure Text (Leave Blank If You Do Not Want): After obtaining clear surgical margins the patient was sent to oculoplastics for surgical repair.  The patient understands they will receive post-surgical care and follow-up from the referring physician's office.
Crescentic Advancement Flap Text: The defect edges were debeveled with a #15 scalpel blade.  Given the location of the defect and the proximity to free margins a crescentic advancement flap was deemed most appropriate.  Using a sterile surgical marker, the appropriate advancement flap was drawn incorporating the defect and placing the expected incisions within the relaxed skin tension lines where possible.    The area thus outlined was incised deep to adipose tissue with a #15 scalpel blade.  The skin margins were undermined to an appropriate distance in all directions utilizing iris scissors.
Partial Purse String (Intermediate) Text: Given the location of the defect and the characteristics of the surrounding skin an intermediate purse string closure was deemed most appropriate.  Undermining was performed circumfirentially around the surgical defect.  A purse string suture was then placed and tightened. Wound tension only allowed a partial closure of the circular defect.
Tissue Cultured Epidermal Autograft Text: The defect edges were debeveled with a #15 scalpel blade.  Given the location of the defect, shape of the defect and the proximity to free margins a tissue cultured epidermal autograft was deemed most appropriate.  The graft was then trimmed to fit the size of the defect.  The graft was then placed in the primary defect and oriented appropriately.
Cheiloplasty (Complex) Text: A decision was made to reconstruct the defect with a  cheiloplasty.  The defect was undermined extensively.  Additional obicularis oris muscle was excised with a 15 blade scalpel.  The defect was converted into a full thickness wedge to facilite a better cosmetic result.  Small vessels were then tied off with 5-0 monocyrl. The obicularis oris, superficial fascia, adipose and dermis were then reapproximated.  After the deeper layers were approximated the epidermis was reapproximated with particular care given to realign the vermillion border.
Muscle Hinge Flap Text: The defect edges were debeveled with a #15 scalpel blade.  Given the size, depth and location of the defect and the proximity to free margins a muscle hinge flap was deemed most appropriate.  Using a sterile surgical marker, an appropriate hinge flap was drawn incorporating the defect. The area thus outlined was incised with a #15 scalpel blade.  The skin margins were undermined to an appropriate distance in all directions utilizing iris scissors.
Epidermal Closure: running
Cheek-To-Nose Interpolation Flap Text: A decision was made to reconstruct the defect utilizing an interpolation axial flap and a staged reconstruction.  A telfa template was made of the defect.  This telfa template was then used to outline the Cheek-To-Nose Interpolation flap.  The donor area for the pedicle flap was then injected with anesthesia.  The flap was excised through the skin and subcutaneous tissue down to the layer of the underlying musculature.  The interpolation flap was carefully excised within this deep plane to maintain its blood supply.  The edges of the donor site were undermined.   The donor site was closed in a primary fashion.  The pedicle was then rotated into position and sutured.  Once the tube was sutured into place, adequate blood supply was confirmed with blanching and refill.  The pedicle was then wrapped with xeroform gauze and dressed appropriately with a telfa and gauze bandage to ensure continued blood supply and protect the attached pedicle.
Repair Anesthesia Method: local infiltration
Double Island Pedicle Flap Text: The defect edges were debeveled with a #15 scalpel blade.  Given the location of the defect, shape of the defect and the proximity to free margins a double island pedicle advancement flap was deemed most appropriate.  Using a sterile surgical marker, an appropriate advancement flap was drawn incorporating the defect, outlining the appropriate donor tissue and placing the expected incisions within the relaxed skin tension lines where possible.    The area thus outlined was incised deep to adipose tissue with a #15 scalpel blade.  The skin margins were undermined to an appropriate distance in all directions around the primary defect and laterally outward around the island pedicle utilizing iris scissors.  There was minimal undermining beneath the pedicle flap.
Location Indication Override (Is Already Calculated Based On Selected Body Location): Area M
Mohs Rapid Report Verbiage: The area of clinically evident tumor was marked with skin marking ink and appropriately hatched.  The initial incision was made following the Mohs approach through the skin.  The specimen was taken to the lab, divided into the necessary number of pieces, chromacoded and processed according to the Mohs protocol.  This was repeated in successive stages until a tumor free defect was achieved.
Referred To Mid-Level For Closure Text (Leave Blank If You Do Not Want): After obtaining clear surgical margins the patient was sent to a mid-level provider for surgical repair.  The patient understands they will receive post-surgical care and follow-up from the mid-level provider.
Area M Indication Text: Tumors in this location are included in Area M (cheek, forehead, scalp, neck, jawline and pretibial skin).  Mohs surgery is indicated for tumors in these anatomic locations.
Postop Diagnosis: same
Consent (Temporal Branch)/Introductory Paragraph: The rationale for Mohs was explained to the patient and consent was obtained. The risks, benefits and alternatives to therapy were discussed in detail. Specifically, the risks of damage to the temporal branch of the facial nerve, infection, scarring, bleeding, prolonged wound healing, incomplete removal, allergy to anesthesia, and recurrence were addressed. Prior to the procedure, the treatment site was clearly identified and confirmed by the patient. All components of Universal Protocol/PAUSE Rule completed.
Bcc Histology Text: There were numerous aggregates of basaloid cells.
Manual Repair Warning Statement: We plan on removing the manually selected variable below in favor of our much easier automatic structured text blocks found in the previous tab. We decided to do this to help make the flow better and give you the full power of structured data. Manual selection is never going to be ideal in our platform and I would encourage you to avoid using manual selection from this point on, especially since I will be sunsetting this feature. It is important that you do one of two things with the customized text below. First, you can save all of the text in a word file so you can have it for future reference. Second, transfer the text to the appropriate area in the Library tab. Lastly, if there is a flap or graft type which we do not have you need to let us know right away so I can add it in before the variable is hidden. No need to panic, we plan to give you roughly 6 months to make the change.
Same Histology In Subsequent Stages Text: The pattern and morphology of the tumor is as described in the first stage.
No Residual Tumor Seen Histology Text: There were no malignant cells seen in the sections examined.
Inflammation Suggestive Of Cancer Camouflage Histology Text: There was a dense lymphocytic infiltrate which prevented adequate histologic evaluation of adjacent structures.
Bcc Infiltrative Histology Text: There were numerous aggregates of basaloid cells demonstrating an infiltrative pattern.

## 2018-02-05 ENCOUNTER — APPOINTMENT (RX ONLY)
Dept: URBAN - METROPOLITAN AREA CLINIC 142 | Facility: CLINIC | Age: 83
Setting detail: DERMATOLOGY
End: 2018-02-05

## 2018-02-05 DIAGNOSIS — Z48.02 ENCOUNTER FOR REMOVAL OF SUTURES: ICD-10-CM

## 2018-02-05 PROCEDURE — ? SUTURE REMOVAL (GLOBAL PERIOD)

## 2018-02-05 PROCEDURE — 99024 POSTOP FOLLOW-UP VISIT: CPT

## 2018-02-05 ASSESSMENT — LOCATION SIMPLE DESCRIPTION DERM: LOCATION SIMPLE: RIGHT FOREHEAD

## 2018-02-05 ASSESSMENT — PAIN INTENSITY VAS: HOW INTENSE IS YOUR PAIN 0 BEING NO PAIN, 10 BEING THE MOST SEVERE PAIN POSSIBLE?: NO PAIN

## 2018-02-05 ASSESSMENT — LOCATION ZONE DERM: LOCATION ZONE: FACE

## 2018-02-05 ASSESSMENT — LOCATION DETAILED DESCRIPTION DERM: LOCATION DETAILED: RIGHT INFERIOR FOREHEAD

## 2018-02-05 NOTE — PROCEDURE: SUTURE REMOVAL (GLOBAL PERIOD)
Add 73414 Cpt? (Important Note: In 2017 The Use Of 56850 Is Being Tracked By Cms To Determine Future Global Period Reimbursement For Global Periods): yes
Detail Level: Detailed

## 2018-12-12 ENCOUNTER — APPOINTMENT (RX ONLY)
Dept: URBAN - METROPOLITAN AREA CLINIC 142 | Facility: CLINIC | Age: 83
Setting detail: DERMATOLOGY
End: 2018-12-12

## 2018-12-12 DIAGNOSIS — M71.3 OTHER BURSAL CYST: ICD-10-CM

## 2018-12-12 DIAGNOSIS — L57.0 ACTINIC KERATOSIS: ICD-10-CM

## 2018-12-12 DIAGNOSIS — Z85.828 PERSONAL HISTORY OF OTHER MALIGNANT NEOPLASM OF SKIN: ICD-10-CM

## 2018-12-12 DIAGNOSIS — D18.0 HEMANGIOMA: ICD-10-CM

## 2018-12-12 PROBLEM — M71.38 OTHER BURSAL CYST, OTHER SITE: Status: ACTIVE | Noted: 2018-12-12

## 2018-12-12 PROBLEM — D18.01 HEMANGIOMA OF SKIN AND SUBCUTANEOUS TISSUE: Status: ACTIVE | Noted: 2018-12-12

## 2018-12-12 PROCEDURE — ? LIQUID NITROGEN

## 2018-12-12 PROCEDURE — 17000 DESTRUCT PREMALG LESION: CPT

## 2018-12-12 PROCEDURE — 99214 OFFICE O/P EST MOD 30 MIN: CPT | Mod: 25

## 2018-12-12 PROCEDURE — ? COUNSELING

## 2018-12-12 ASSESSMENT — LOCATION SIMPLE DESCRIPTION DERM
LOCATION SIMPLE: RIGHT FOREHEAD
LOCATION SIMPLE: LEFT ELBOW
LOCATION SIMPLE: RIGHT UPPER BACK
LOCATION SIMPLE: RIGHT CHEEK
LOCATION SIMPLE: NOSE
LOCATION SIMPLE: LEFT ZYGOMA

## 2018-12-12 ASSESSMENT — LOCATION ZONE DERM
LOCATION ZONE: FACE
LOCATION ZONE: ARM
LOCATION ZONE: NOSE
LOCATION ZONE: TRUNK

## 2018-12-12 ASSESSMENT — LOCATION DETAILED DESCRIPTION DERM
LOCATION DETAILED: LEFT LATERAL ZYGOMA
LOCATION DETAILED: RIGHT INFERIOR FOREHEAD
LOCATION DETAILED: NASAL DORSUM
LOCATION DETAILED: RIGHT INFERIOR CENTRAL MALAR CHEEK
LOCATION DETAILED: RIGHT MID-UPPER BACK
LOCATION DETAILED: LEFT ELBOW

## 2018-12-12 ASSESSMENT — PAIN INTENSITY VAS: HOW INTENSE IS YOUR PAIN 0 BEING NO PAIN, 10 BEING THE MOST SEVERE PAIN POSSIBLE?: NO PAIN

## 2018-12-12 NOTE — PROCEDURE: LIQUID NITROGEN
Duration Of Freeze Thaw-Cycle (Seconds): 2
Post-Care Instructions: I reviewed with the patient in detail post-care instructions. Patient is to wear sunprotection, and avoid picking at any of the treated lesions. Pt may apply Vaseline to crusted or scabbing areas.
Detail Level: Detailed
Render Post-Care Instructions In Note?: no
Number Of Freeze-Thaw Cycles: 3 freeze-thaw cycles
Consent: The patient's verbal consent was obtained including but not limited to risks of crusting, scabbing, blistering, scarring, darker or lighter pigmentary change, recurrence, incomplete removal and infection.Call if not gone in one month.

## 2019-04-06 ENCOUNTER — HOSPITAL ENCOUNTER (INPATIENT)
Dept: HOSPITAL 96 - M.ERS | Age: 84
LOS: 5 days | Discharge: NURSING, REHAB OR LONG-TERM CARE | DRG: 69 | End: 2019-04-11
Attending: FAMILY MEDICINE | Admitting: FAMILY MEDICINE
Payer: COMMERCIAL

## 2019-04-06 VITALS — HEIGHT: 65.98 IN | BODY MASS INDEX: 20.87 KG/M2 | WEIGHT: 129.85 LBS

## 2019-04-06 VITALS — DIASTOLIC BLOOD PRESSURE: 70 MMHG | SYSTOLIC BLOOD PRESSURE: 131 MMHG

## 2019-04-06 VITALS — DIASTOLIC BLOOD PRESSURE: 51 MMHG | SYSTOLIC BLOOD PRESSURE: 110 MMHG

## 2019-04-06 VITALS — DIASTOLIC BLOOD PRESSURE: 61 MMHG | SYSTOLIC BLOOD PRESSURE: 131 MMHG
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
## 2019-04-06 DIAGNOSIS — E11.65: ICD-10-CM
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
## 2019-04-06 DIAGNOSIS — M47.892: ICD-10-CM

## 2019-04-06 DIAGNOSIS — T38.3X5A: ICD-10-CM

## 2019-04-06 DIAGNOSIS — Z86.73: ICD-10-CM
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
## 2019-04-06 DIAGNOSIS — I10: ICD-10-CM

## 2019-04-06 DIAGNOSIS — E87.2: ICD-10-CM

## 2019-04-06 DIAGNOSIS — G45.9: Primary | ICD-10-CM

## 2019-04-06 DIAGNOSIS — E78.5: ICD-10-CM

## 2019-04-06 DIAGNOSIS — Z79.82: ICD-10-CM
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
## 2019-04-06 DIAGNOSIS — Z82.49: ICD-10-CM

## 2019-04-06 DIAGNOSIS — Z79.899: ICD-10-CM

## 2019-04-06 DIAGNOSIS — K59.09: ICD-10-CM

## 2019-04-06 DIAGNOSIS — G93.41: ICD-10-CM

## 2019-04-06 LAB
ABSOLUTE EOSINOPHILS: 0.1 THOU/UL (ref 0–0.7)
ABSOLUTE MONOCYTES: 0.9 THOU/UL (ref 0–1.2)
ALBUMIN SERPL-MCNC: 3.1 G/DL (ref 3.4–5)
ALP SERPL-CCNC: 95 U/L (ref 46–116)
ALT SERPL-CCNC: 33 U/L (ref 30–65)
ANION GAP SERPL CALC-SCNC: 13 MMOL/L (ref 7–16)
APTT BLD: 32.2 SECONDS (ref 25–31.3)
AST SERPL-CCNC: 19 U/L (ref 15–37)
BE: -1.1 MMOL/L
BILIRUB SERPL-MCNC: 0.4 MG/DL
BILIRUB UR-MCNC: NEGATIVE MG/DL
BUN SERPL-MCNC: 15 MG/DL (ref 7–18)
CALCIUM SERPL-MCNC: 8.8 MG/DL (ref 8.5–10.1)
CHLORIDE SERPL-SCNC: 100 MMOL/L (ref 98–107)
CO2 SERPL-SCNC: 25 MMOL/L (ref 21–32)
COLOR UR: YELLOW
CREAT SERPL-MCNC: 1.1 MG/DL (ref 0.6–1.3)
EOSINOPHIL NFR BLD: 1 %
GLUCOSE SERPL-MCNC: 194 MG/DL (ref 70–99)
GRANULOCYTES NFR BLD MANUAL: 87 %
HCT VFR BLD CALC: 37.4 % (ref 42–52)
HGB BLD-MCNC: 12.5 GM/DL (ref 14–18)
INR PPP: 1
KETONES UR STRIP-MCNC: NEGATIVE MG/DL
LYMPHOCYTES # BLD: 0.5 THOU/UL (ref 0.8–5.3)
LYMPHOCYTES NFR BLD AUTO: 4 %
MCH RBC QN AUTO: 30.4 PG (ref 26–34)
MCHC RBC AUTO-ENTMCNC: 33.4 G/DL (ref 28–37)
MCV RBC: 91.1 FL (ref 80–100)
MONOCYTES NFR BLD: 8 %
MPV: 7.5 FL. (ref 7.2–11.1)
NEUTROPHILS # BLD: 9.9 THOU/UL (ref 1.6–8.1)
NUCLEATED RBCS: 0 /100WBC
PCO2 BLD: 34.9 MMHG (ref 35–45)
PLATELET # BLD EST: ADEQUATE 10*3/UL
PLATELET COUNT*: 303 THOU/UL (ref 150–400)
PO2 BLD: 61.4 MMHG (ref 75–100)
POTASSIUM SERPL-SCNC: 3.8 MMOL/L (ref 3.5–5.1)
PROT SERPL-MCNC: 7.5 G/DL (ref 6.4–8.2)
PROT UR QL STRIP: NEGATIVE
PROTHROMBIN TIME: 10.4 SECONDS (ref 9.2–11.5)
RBC # BLD AUTO: 4.1 MIL/UL (ref 4.5–6)
RBC # UR STRIP: NEGATIVE /UL
RBC MORPH BLD: NORMAL
RDW-CV: 13.5 % (ref 10.5–14.5)
SODIUM SERPL-SCNC: 138 MMOL/L (ref 136–145)
SP GR UR STRIP: <= 1.005 (ref 1–1.03)
URINE CLARITY: CLEAR
URINE GLUCOSE-RANDOM: NEGATIVE
URINE LEUKOCYTES-REFLEX: NEGATIVE
URINE NITRITE-REFLEX: NEGATIVE
UROBILINOGEN UR STRIP-ACNC: 0.2 E.U./DL (ref 0.2–1)
WBC # BLD AUTO: 11.4 THOU/UL (ref 4–11)

## 2019-04-07 VITALS — DIASTOLIC BLOOD PRESSURE: 40 MMHG | SYSTOLIC BLOOD PRESSURE: 125 MMHG

## 2019-04-07 VITALS — SYSTOLIC BLOOD PRESSURE: 123 MMHG | DIASTOLIC BLOOD PRESSURE: 57 MMHG

## 2019-04-07 VITALS — DIASTOLIC BLOOD PRESSURE: 60 MMHG | SYSTOLIC BLOOD PRESSURE: 121 MMHG

## 2019-04-07 VITALS — DIASTOLIC BLOOD PRESSURE: 65 MMHG | SYSTOLIC BLOOD PRESSURE: 123 MMHG

## 2019-04-07 VITALS — SYSTOLIC BLOOD PRESSURE: 125 MMHG | DIASTOLIC BLOOD PRESSURE: 40 MMHG

## 2019-04-07 VITALS — SYSTOLIC BLOOD PRESSURE: 109 MMHG | DIASTOLIC BLOOD PRESSURE: 47 MMHG

## 2019-04-07 VITALS — DIASTOLIC BLOOD PRESSURE: 47 MMHG | SYSTOLIC BLOOD PRESSURE: 109 MMHG

## 2019-04-07 LAB
ANION GAP SERPL CALC-SCNC: 7 MMOL/L (ref 7–16)
BUN SERPL-MCNC: 14 MG/DL (ref 7–18)
CALCIUM SERPL-MCNC: 9.2 MG/DL (ref 8.5–10.1)
CHLORIDE SERPL-SCNC: 105 MMOL/L (ref 98–107)
CO2 SERPL-SCNC: 29 MMOL/L (ref 21–32)
CREAT SERPL-MCNC: 1 MG/DL (ref 0.6–1.3)
GLUCOSE SERPL-MCNC: 108 MG/DL (ref 70–99)
HCT VFR BLD CALC: 32.1 % (ref 42–52)
HGB BLD-MCNC: 10.8 GM/DL (ref 14–18)
MAGNESIUM SERPL-MCNC: 2.4 MG/DL (ref 1.8–2.4)
MCH RBC QN AUTO: 30.6 PG (ref 26–34)
MCHC RBC AUTO-ENTMCNC: 33.5 G/DL (ref 28–37)
MCV RBC: 91.5 FL (ref 80–100)
MPV: 7.3 FL. (ref 7.2–11.1)
PLATELET COUNT*: 250 THOU/UL (ref 150–400)
POTASSIUM SERPL-SCNC: 4.1 MMOL/L (ref 3.5–5.1)
RBC # BLD AUTO: 3.51 MIL/UL (ref 4.5–6)
RDW-CV: 13.5 % (ref 10.5–14.5)
SODIUM SERPL-SCNC: 141 MMOL/L (ref 136–145)
WBC # BLD AUTO: 7 THOU/UL (ref 4–11)

## 2019-04-08 VITALS — SYSTOLIC BLOOD PRESSURE: 128 MMHG | DIASTOLIC BLOOD PRESSURE: 65 MMHG

## 2019-04-08 VITALS — DIASTOLIC BLOOD PRESSURE: 67 MMHG | SYSTOLIC BLOOD PRESSURE: 137 MMHG

## 2019-04-08 VITALS — SYSTOLIC BLOOD PRESSURE: 109 MMHG | DIASTOLIC BLOOD PRESSURE: 48 MMHG

## 2019-04-08 VITALS — SYSTOLIC BLOOD PRESSURE: 119 MMHG | DIASTOLIC BLOOD PRESSURE: 61 MMHG

## 2019-04-08 VITALS — DIASTOLIC BLOOD PRESSURE: 55 MMHG | SYSTOLIC BLOOD PRESSURE: 126 MMHG

## 2019-04-08 LAB
ANION GAP SERPL CALC-SCNC: 8 MMOL/L (ref 7–16)
BUN SERPL-MCNC: 16 MG/DL (ref 7–18)
CALCIUM SERPL-MCNC: 8.6 MG/DL (ref 8.5–10.1)
CHLORIDE SERPL-SCNC: 106 MMOL/L (ref 98–107)
CO2 SERPL-SCNC: 30 MMOL/L (ref 21–32)
CREAT SERPL-MCNC: 0.9 MG/DL (ref 0.6–1.3)
GLUCOSE SERPL-MCNC: 93 MG/DL (ref 70–99)
HCT VFR BLD CALC: 31.6 % (ref 42–52)
HGB BLD-MCNC: 10.7 GM/DL (ref 14–18)
MAGNESIUM SERPL-MCNC: 2.3 MG/DL (ref 1.8–2.4)
MCH RBC QN AUTO: 30.8 PG (ref 26–34)
MCHC RBC AUTO-ENTMCNC: 33.7 G/DL (ref 28–37)
MCV RBC: 91.5 FL (ref 80–100)
MPV: 7.4 FL. (ref 7.2–11.1)
PLATELET COUNT*: 256 THOU/UL (ref 150–400)
POTASSIUM SERPL-SCNC: 4.1 MMOL/L (ref 3.5–5.1)
RBC # BLD AUTO: 3.46 MIL/UL (ref 4.5–6)
RDW-CV: 13.2 % (ref 10.5–14.5)
SODIUM SERPL-SCNC: 144 MMOL/L (ref 136–145)
WBC # BLD AUTO: 6.4 THOU/UL (ref 4–11)

## 2019-04-09 VITALS — SYSTOLIC BLOOD PRESSURE: 142 MMHG | DIASTOLIC BLOOD PRESSURE: 89 MMHG

## 2019-04-09 VITALS — DIASTOLIC BLOOD PRESSURE: 66 MMHG | SYSTOLIC BLOOD PRESSURE: 138 MMHG

## 2019-04-09 VITALS — DIASTOLIC BLOOD PRESSURE: 68 MMHG | SYSTOLIC BLOOD PRESSURE: 104 MMHG

## 2019-04-09 VITALS — DIASTOLIC BLOOD PRESSURE: 71 MMHG | SYSTOLIC BLOOD PRESSURE: 139 MMHG

## 2019-04-09 VITALS — SYSTOLIC BLOOD PRESSURE: 145 MMHG | DIASTOLIC BLOOD PRESSURE: 58 MMHG

## 2019-04-09 VITALS — DIASTOLIC BLOOD PRESSURE: 77 MMHG | SYSTOLIC BLOOD PRESSURE: 145 MMHG

## 2019-04-09 LAB
ABSOLUTE BASOPHILS: 0 THOU/UL (ref 0–0.2)
ABSOLUTE EOSINOPHILS: 0.3 THOU/UL (ref 0–0.7)
ABSOLUTE MONOCYTES: 0.6 THOU/UL (ref 0–1.2)
ANION GAP SERPL CALC-SCNC: 10 MMOL/L (ref 7–16)
BASOPHILS NFR BLD AUTO: 0.5 %
BUN SERPL-MCNC: 12 MG/DL (ref 7–18)
CALCIUM SERPL-MCNC: 9.1 MG/DL (ref 8.5–10.1)
CHLORIDE SERPL-SCNC: 105 MMOL/L (ref 98–107)
CO2 SERPL-SCNC: 27 MMOL/L (ref 21–32)
CREAT SERPL-MCNC: 0.9 MG/DL (ref 0.6–1.3)
EOSINOPHIL NFR BLD: 4.7 %
GLUCOSE SERPL-MCNC: 109 MG/DL (ref 70–99)
GRANULOCYTES NFR BLD MANUAL: 65.5 %
HCT VFR BLD CALC: 35.5 % (ref 42–52)
HGB BLD-MCNC: 11.7 GM/DL (ref 14–18)
LYMPHOCYTES # BLD: 1.3 THOU/UL (ref 0.8–5.3)
LYMPHOCYTES NFR BLD AUTO: 19.9 %
MCH RBC QN AUTO: 30.1 PG (ref 26–34)
MCHC RBC AUTO-ENTMCNC: 33.1 G/DL (ref 28–37)
MCV RBC: 90.9 FL (ref 80–100)
MONOCYTES NFR BLD: 9.4 %
MPV: 7.6 FL. (ref 7.2–11.1)
NEUTROPHILS # BLD: 4.4 THOU/UL (ref 1.6–8.1)
NUCLEATED RBCS: 0 /100WBC
PLATELET COUNT*: 298 THOU/UL (ref 150–400)
POTASSIUM SERPL-SCNC: 3.8 MMOL/L (ref 3.5–5.1)
RBC # BLD AUTO: 3.9 MIL/UL (ref 4.5–6)
RDW-CV: 13.4 % (ref 10.5–14.5)
SODIUM SERPL-SCNC: 142 MMOL/L (ref 136–145)
WBC # BLD AUTO: 6.7 THOU/UL (ref 4–11)

## 2019-04-10 VITALS — DIASTOLIC BLOOD PRESSURE: 72 MMHG | SYSTOLIC BLOOD PRESSURE: 145 MMHG

## 2019-04-10 VITALS — DIASTOLIC BLOOD PRESSURE: 61 MMHG | SYSTOLIC BLOOD PRESSURE: 147 MMHG

## 2019-04-10 VITALS — SYSTOLIC BLOOD PRESSURE: 143 MMHG | DIASTOLIC BLOOD PRESSURE: 58 MMHG

## 2019-04-10 VITALS — DIASTOLIC BLOOD PRESSURE: 58 MMHG | SYSTOLIC BLOOD PRESSURE: 143 MMHG

## 2019-04-10 VITALS — SYSTOLIC BLOOD PRESSURE: 122 MMHG | DIASTOLIC BLOOD PRESSURE: 68 MMHG

## 2019-04-10 VITALS — SYSTOLIC BLOOD PRESSURE: 150 MMHG | DIASTOLIC BLOOD PRESSURE: 85 MMHG

## 2019-04-11 VITALS — DIASTOLIC BLOOD PRESSURE: 70 MMHG | SYSTOLIC BLOOD PRESSURE: 159 MMHG

## 2019-04-11 VITALS — DIASTOLIC BLOOD PRESSURE: 56 MMHG | SYSTOLIC BLOOD PRESSURE: 115 MMHG

## 2019-04-11 VITALS — DIASTOLIC BLOOD PRESSURE: 69 MMHG | SYSTOLIC BLOOD PRESSURE: 144 MMHG

## 2019-04-11 VITALS — DIASTOLIC BLOOD PRESSURE: 64 MMHG | SYSTOLIC BLOOD PRESSURE: 122 MMHG

## 2019-05-10 ENCOUNTER — HOSPITAL ENCOUNTER (INPATIENT)
Dept: HOSPITAL 96 - M.REH | Age: 84
LOS: 27 days | Discharge: HOME HEALTH | DRG: 56 | End: 2019-06-06
Attending: PHYSICAL MEDICINE & REHABILITATION | Admitting: PHYSICAL MEDICINE & REHABILITATION
Payer: COMMERCIAL

## 2019-05-10 VITALS — SYSTOLIC BLOOD PRESSURE: 126 MMHG | DIASTOLIC BLOOD PRESSURE: 56 MMHG

## 2019-05-10 VITALS — BODY MASS INDEX: 20.09 KG/M2 | WEIGHT: 125 LBS | HEIGHT: 65.98 IN

## 2019-05-10 DIAGNOSIS — I69.991: ICD-10-CM

## 2019-05-10 DIAGNOSIS — R13.10: ICD-10-CM

## 2019-05-10 DIAGNOSIS — R47.1: ICD-10-CM

## 2019-05-10 DIAGNOSIS — I69.922: ICD-10-CM

## 2019-05-10 DIAGNOSIS — Z87.440: ICD-10-CM

## 2019-05-10 DIAGNOSIS — E86.0: ICD-10-CM

## 2019-05-10 DIAGNOSIS — G93.40: ICD-10-CM

## 2019-05-10 DIAGNOSIS — E78.5: ICD-10-CM

## 2019-05-10 DIAGNOSIS — R26.9: ICD-10-CM

## 2019-05-10 DIAGNOSIS — Z79.899: ICD-10-CM

## 2019-05-10 DIAGNOSIS — I10: ICD-10-CM

## 2019-05-10 DIAGNOSIS — R00.1: ICD-10-CM

## 2019-05-10 DIAGNOSIS — M47.892: ICD-10-CM

## 2019-05-10 DIAGNOSIS — I63.9: ICD-10-CM

## 2019-05-10 DIAGNOSIS — M47.812: ICD-10-CM

## 2019-05-10 DIAGNOSIS — I69.354: Primary | ICD-10-CM

## 2019-05-10 DIAGNOSIS — G31.84: ICD-10-CM

## 2019-05-10 DIAGNOSIS — K59.00: ICD-10-CM

## 2019-05-10 DIAGNOSIS — E11.9: ICD-10-CM

## 2019-05-10 DIAGNOSIS — Z79.82: ICD-10-CM

## 2019-05-10 DIAGNOSIS — E87.6: ICD-10-CM

## 2019-05-10 LAB
BILIRUB UR-MCNC: NEGATIVE MG/DL
COLOR UR: YELLOW
KETONES UR STRIP-MCNC: NEGATIVE MG/DL
PROT UR QL STRIP: NEGATIVE
RBC # UR STRIP: NEGATIVE /UL
SP GR UR STRIP: 1.01 (ref 1–1.03)
URINE CLARITY: CLEAR
URINE GLUCOSE-RANDOM: NEGATIVE
URINE LEUKOCYTES-REFLEX: NEGATIVE
URINE NITRITE-REFLEX: NEGATIVE
UROBILINOGEN UR STRIP-ACNC: 0.2 E.U./DL (ref 0.2–1)

## 2019-05-11 VITALS — DIASTOLIC BLOOD PRESSURE: 52 MMHG | SYSTOLIC BLOOD PRESSURE: 130 MMHG

## 2019-05-11 VITALS — SYSTOLIC BLOOD PRESSURE: 135 MMHG | DIASTOLIC BLOOD PRESSURE: 76 MMHG

## 2019-05-11 LAB
ANION GAP SERPL CALC-SCNC: 9 MMOL/L (ref 7–16)
BUN SERPL-MCNC: 12 MG/DL (ref 7–18)
CALCIUM SERPL-MCNC: 8.8 MG/DL (ref 8.5–10.1)
CHLORIDE SERPL-SCNC: 107 MMOL/L (ref 98–107)
CO2 SERPL-SCNC: 28 MMOL/L (ref 21–32)
CREAT SERPL-MCNC: 0.7 MG/DL (ref 0.6–1.3)
GLUCOSE SERPL-MCNC: 102 MG/DL (ref 70–99)
HCT VFR BLD CALC: 35.5 % (ref 42–52)
HGB BLD-MCNC: 11.6 GM/DL (ref 14–18)
MCH RBC QN AUTO: 29.2 PG (ref 26–34)
MCHC RBC AUTO-ENTMCNC: 32.8 G/DL (ref 28–37)
MCV RBC: 89.1 FL (ref 80–100)
MPV: 7.6 FL. (ref 7.2–11.1)
PLATELET COUNT*: 323 THOU/UL (ref 150–400)
POTASSIUM SERPL-SCNC: 3 MMOL/L (ref 3.5–5.1)
RBC # BLD AUTO: 3.98 MIL/UL (ref 4.5–6)
RDW-CV: 14.2 % (ref 10.5–14.5)
SODIUM SERPL-SCNC: 144 MMOL/L (ref 136–145)
WBC # BLD AUTO: 5.8 THOU/UL (ref 4–11)

## 2019-05-11 NOTE — NUR
ASSUMED CARE AT 1945 AS PATIENT ADMITTED TO ROOM 326 FROM [facility].
CAME TO FLOOR PER W/C WITH SON ACCOMPANYING HIM. PATIENT ALERT, ORIENTED TO
PLACE, SITUATION, AND SELF, BUT HAS PROBLEMS REMEMBERING DATE, DAY, MONTH AND
SEASON. ASSESSMENT COMPLETE. BUTTOCKS PINK, MOISTURE BARRIER APPLIED. ASSISTED
TO LIE ON SIDE, POSITIONED WITH PILLOWS. VOIDS PER URINAL. BLADDER SCANNED
IMMEDIATELY POST VOID, GOT 13 ML. U/A SENT TO LAB WITH FIRST VOID. TAKES PILLS
ONE AT A TIME WITH WATER. MEDICATED FOR SACROILIAC PAIN WITH TYLENOL WITH
SLEEPING OBSERVED. SOME THICKENED SPEECH NOTED. NOT ON DIABETIC MEDS, BLOOD
SUGAR CHECK ORDERED FOR AM ONLY. TURNS WITH ASSIST. LT SIDED WEAKNESS NOTED,
BUT MOVES ALL EXTREMITIES. HOURLY ROUNDS CONTINUE. BED ALARM ON. CALL LITE IN
REACH.

## 2019-05-11 NOTE — NUR
RESTED QUITELY MOST OF THE NIGHT, SNORING RESPS NOTED AT TIMES. MOVES SELF IN
BED. NO C/O PAIN. HOURLY ROUNDS CONTINUE. BED ALARM ON. CALL LITE IN REACH.

## 2019-05-12 VITALS — DIASTOLIC BLOOD PRESSURE: 56 MMHG | SYSTOLIC BLOOD PRESSURE: 139 MMHG

## 2019-05-12 VITALS — DIASTOLIC BLOOD PRESSURE: 59 MMHG | SYSTOLIC BLOOD PRESSURE: 119 MMHG

## 2019-05-12 LAB
ALBUMIN SERPL-MCNC: 2.9 G/DL (ref 3.4–5)
ALP SERPL-CCNC: 111 U/L (ref 46–116)
ALT SERPL-CCNC: 23 U/L (ref 30–65)
ANION GAP SERPL CALC-SCNC: 10 MMOL/L (ref 7–16)
AST SERPL-CCNC: 13 U/L (ref 15–37)
BILIRUB SERPL-MCNC: 0.3 MG/DL
BUN SERPL-MCNC: 14 MG/DL (ref 7–18)
CALCIUM SERPL-MCNC: 8.8 MG/DL (ref 8.5–10.1)
CHLORIDE SERPL-SCNC: 109 MMOL/L (ref 98–107)
CO2 SERPL-SCNC: 26 MMOL/L (ref 21–32)
CREAT SERPL-MCNC: 0.7 MG/DL (ref 0.6–1.3)
GLUCOSE SERPL-MCNC: 101 MG/DL (ref 70–99)
POTASSIUM SERPL-SCNC: 4 MMOL/L (ref 3.5–5.1)
PROT SERPL-MCNC: 6.4 G/DL (ref 6.4–8.2)
SODIUM SERPL-SCNC: 145 MMOL/L (ref 136–145)

## 2019-05-12 NOTE — NUR
ASSUMED CARE AT 1920. ALERT AND ORIENTED. PLEASANT. DENIED ANY NEED FOR PAIN
MEDS. TOOK PILLS WHOLE. USED URINAL AND RN EMPTIED. NO ISSUES OVERNIGHT. SLEPT
WELL. CALL LIGHT IN REACH AND BED ALARM ON.

## 2019-05-12 NOTE — NUR
ASSUMED CARE AT 0730.  ALERT AND ORIENTED PLEASANT COOPERATIVE.  HX OF CVA L
SIDE WEAKNESS.  NEEDS ASSIST TO GET FROM LYING TO SITTING POSITION ON SIDE OF
BED EATS BREAKFAST WITH SET UP FEEDS SELF APPETITE GOOD.  MEDICATED X 1 WITH
TYLENOL FOR C/O HIP PAIN WITH SLIGHT RELIEF STATED.  TAKES MEDS WITH WATER 1
AT A TIME WITHOUT DIFFICULTY.  VOIDS PER URINAL WITH ASSIST.  ABLE TO GET LEGS
INTO BED WITHOUT ASSISTANCE.  USES CALL LIGHT APPROPRIATELY FOR ASSIST.

## 2019-05-13 VITALS — DIASTOLIC BLOOD PRESSURE: 69 MMHG | SYSTOLIC BLOOD PRESSURE: 126 MMHG

## 2019-05-13 VITALS — SYSTOLIC BLOOD PRESSURE: 139 MMHG | DIASTOLIC BLOOD PRESSURE: 48 MMHG

## 2019-05-13 NOTE — NUR
ASSUMED CARE AT 0730.  ALERT ORIENTED PLEASANT COOPERATIVE.  HX OF CVA L SIDE
WEAKNESS.  FEEDS SELF WITH SET UP APPETITE GOOD TAKES MEDS I AT A TIME WITH
WATER WITHOUT DIFFICULTY.  PARTICIPATING IN THERAPIES.  TRANSFERS WITH 1
ASSIST G BELT WALKER CUEING HAS DIFFICULTY GETTING RT. FOOT TO MOVE AND TURN
CORNERS.  MEDICATED WITH PRN TYLENOL THIS A.M. BEFORE THERAPIES STARTED.
USES CALL LIGHT APPROPRIATELY FOR ASSIST.  VOIDS PER URINAL STAFF EMPTIES.

## 2019-05-13 NOTE — NUR
Nutrition: Consult for new rehab pt. Admitted with Rt CVA. Alb 2.9. +BM. Good
appetite, 100% of meals consumed. Wt: 131#. H/o DM, HTN, HLD. Regular diet
ordered. No nutrition concerns at this time. Low risk.

## 2019-05-13 NOTE — NUR
ASSUMED CARES AT 1920. ALERT AND ORIENTED. PLEASANT. MOD ASSIST WITH GAIT BELT
AND WALKER. NEEDS BOOST FROM SIT TO STAND. SHUFFLES FEET. RIGHT FOOT IS
WEAKER. PT HAS MUCH DIFFICULTY TURNING AROUND TO SIT DOWN. RIGHT FOOT DOES NOT
WANT TO "COOPERATE". PT USED URINAL. HAD BM IN TOILET. RN ASSISTED WITH CARES
AND DRESSING. SLEPT WELL MOST OF THE NIGHT. CALL LIGHT IN REACH AND BED ALARM
ON.

## 2019-05-13 NOTE — NUR
SW met with pt to complete initial assessment, introduce self, and SW role on
inpt rehab unit.  Pt plans to dc home with son and wife.  Pt goal is for being
able to safely manage the stairs in his home and strengthen his
walking/mobility.  Pt has a RW and wc if needed.  Pt has hx of [facility] at
previous dc 4/11/19.  Pt has hx of HH; agency name unknown.  SW to continue to
follow to assist with safe dc planning.

## 2019-05-13 NOTE — NUR
ASSUMED CARE AT 0730.  ALERT ORIENTED PLEASANT COOPERATIVE.  HX OF CVA L ALEXY.
TRANSFERS WITH 1 ASSIST G BELT WALKER FROM BED TO CHAIR HAS DIFFICULTY TURNING
CORNERS WITH RT. FOOT MOVEMENT.  NEEDS CUES.  MEDICATED WITH TYLENOL THIS A.M.
FEEDS SELF TAKES MEDS 1 AT A TIME WITH WATER WITHOUT DIFFICULTY. APPETITE
GOOD. VOIDS PER URINAL.  PARTICIPATING IN THERAPIES THROUGHOUT THE DAY. USES
CALL LIGHT APPROPRIATELY FOR ASSIST.

## 2019-05-14 VITALS — SYSTOLIC BLOOD PRESSURE: 135 MMHG | DIASTOLIC BLOOD PRESSURE: 85 MMHG

## 2019-05-14 VITALS — SYSTOLIC BLOOD PRESSURE: 116 MMHG | DIASTOLIC BLOOD PRESSURE: 61 MMHG

## 2019-05-14 NOTE — NUR
Assumed patient care at 1900.  Patient alert and oriented times four.
Ambulates in wc independently.  Patient also able manage clothing
indpendently.  Very anxious to get him home.  RN assessment completed as
charted.

## 2019-05-14 NOTE — NUR
PATIENT IS ALERT AND ORIENTEC X 4, TRANSFER ASSIST OF ONE EXTENSIVE ASSIST. TO
MAIN DINNING AREA FOR MEALS. PAIN BACK MEDICATED WITH TYLENOL. NO FURTHER
COMPLAINTS. CONT. TO MONITOR PAIN. CONT. WITH PLAN OF CARE AT THIS TIME.

## 2019-05-15 VITALS — DIASTOLIC BLOOD PRESSURE: 43 MMHG | SYSTOLIC BLOOD PRESSURE: 119 MMHG

## 2019-05-15 VITALS — SYSTOLIC BLOOD PRESSURE: 127 MMHG | DIASTOLIC BLOOD PRESSURE: 58 MMHG

## 2019-05-15 NOTE — NUR
PATIENT HAS BEEN AMBULATING WITH ROLLING WALKER. REQUIRES EXT ASSIST WITH
STANDING. CONT. OF BOWEL AND BLADDER DURING THE DAY. DENIES COMPLAINTS OF PAIN
OR DISCOMFORT. NO SIGN OF DISTRESS AT THIS TIME. CONT. WITH CURRENT PLAN OF
CARE AT THIS TIME.

## 2019-05-15 NOTE — NUR
SW met with pt and pt son to review team conference summary and plan for pt to
remain on rehab unit one more week with team to reassess pt length of stay
during team conference next Wednesday. Pt/pt son concerned for pt arthritis
being a barrier to progress and hoped to find ways to manage pain so pt could
be more functionally mobile in walking and stairs and ADLs.  Pt and pt son in
agreement with plan for reteam next Wednesday.  SW to continue to follow to
assist with safe dc planning.

## 2019-05-15 NOTE — NUR
ASSUMED PATIENT CARE AT 1900.  PATIENT ALERT AND ORIENTED TIMES FOUR.  ABLE TO
AMBULATE TO THE RESTROOM, VERY SLOW.  PATIENT UNABLE TO DO MORE THAN A SHUFFLE
STEP.  NOTED LACK OF BED MOBILITY AND OVERALL PATIENT WEAKNESS.  REMAINS
PLEASANT AND AWARE OF NEEDS.  RN ASSESSMENT AND HOURLY ROUNDING COMLETED AS
CHARTED

## 2019-05-16 VITALS — SYSTOLIC BLOOD PRESSURE: 128 MMHG | DIASTOLIC BLOOD PRESSURE: 70 MMHG

## 2019-05-16 VITALS — SYSTOLIC BLOOD PRESSURE: 127 MMHG | DIASTOLIC BLOOD PRESSURE: 58 MMHG

## 2019-05-16 VITALS — SYSTOLIC BLOOD PRESSURE: 118 MMHG | DIASTOLIC BLOOD PRESSURE: 50 MMHG

## 2019-05-16 VITALS — SYSTOLIC BLOOD PRESSURE: 140 MMHG | DIASTOLIC BLOOD PRESSURE: 72 MMHG

## 2019-05-16 VITALS — SYSTOLIC BLOOD PRESSURE: 121 MMHG | DIASTOLIC BLOOD PRESSURE: 48 MMHG

## 2019-05-16 LAB
ANION GAP SERPL CALC-SCNC: 11 MMOL/L (ref 7–16)
BUN SERPL-MCNC: 32 MG/DL (ref 7–18)
CALCIUM SERPL-MCNC: 9.4 MG/DL (ref 8.5–10.1)
CHLORIDE SERPL-SCNC: 105 MMOL/L (ref 98–107)
CO2 SERPL-SCNC: 28 MMOL/L (ref 21–32)
CREAT SERPL-MCNC: 0.8 MG/DL (ref 0.6–1.3)
GLUCOSE SERPL-MCNC: 117 MG/DL (ref 70–99)
MAGNESIUM SERPL-MCNC: 2.5 MG/DL (ref 1.8–2.4)
POTASSIUM SERPL-SCNC: 4 MMOL/L (ref 3.5–5.1)
SODIUM SERPL-SCNC: 144 MMOL/L (ref 136–145)

## 2019-05-16 NOTE — NUR
ASSUMED CARES AT 1920. ALERT AND ORIENTED. PLEASANT. DENIED ANY PAIN.  USED
URINAL OVERNIGHT. SLEPT WELL. AT 0630, PT HAD INTERCEPTED FALL IN BATHROOM.
MIN ASSIST WITH GAIT BELT AND WALKER. PCA WALKED WITH PT INTO BATHROOM.  RIGHT
WHEN REACHED TOILET PT'S KNEES GAVE WAY. PCA WAS ABLE TO HOLD PT UP AND PT DID
NOT HIT THE FLOOR.  THIS RN ALSO THEN ASSISTED PT UP ONTO TOILET AND THEN BACK
INTO BED. PT REPORTED THAT HIS LEGS BECAME EXTREMELY WEAK AFTER WALKING TO
BATHROOM.  NO OTHER ISSUES. VITALS WNL. PT RESTING COMFORTABLY IN BED AT THIS
TIME.

## 2019-05-16 NOTE — [place] [address]
Phone:  [phone]                     ELECTROCARDIOGRAM REPORT      
_______________________________________________________________________________
 
Name:       GIANNA ALFREDO           Room:           [address]    ADM IN  
M.R.#:  [id number]      Account #:      [id number]  
Admission:  05/10/19     Attend Phys:    Mohsen Tahani, MD    
Discharge:               Date of Birth:  [birth year]  
         Report #: [id number]
    [id number]
_______________________________________________________________________________
THIS REPORT FOR:  //name//                      
 
                          [facility]
                                       
Test Date:    2019               Test Time:    15:18:50
Pat Name:     GIANNA ALFREDO            Department:   
Patient ID:   [id number]            Room:         [address]
Gender:       M                        Technician:   
:          [id number]               Requested By: Yuniel Fletcher
Order Number: [id number]    Reading MD:   Tanner Castañeda
                                 Measurements
Intervals                              Axis          
Rate:         89                       P:            42
SD:           213                      QRS:          -20
QRSD:         103                      T:            81
QT:           383                                    
QTc:          467                                    
                           Interpretive Statements
Sinus rhythm
Ventricular bigeminy
First-degree AV block
Left axis deviation
Low voltage, precordial leads
Minimal ST depression, lateral leads
Compared to ECG 2017 14:07:23
Ventricular premature complex(es) now present
Low QRS voltage now present
ST (T wave) deviation now present
Left ventricular hypertrophy no longer present
 
Electronically Signed On 2019 15:48:18 CDT by Tanner Castañeda
[email or web address]
 
 
 
 
 
 
 
 
 
 
 
  <ELECTRONICALLY SIGNED>
                                           By: Tanner Castañeda MD, [facility]   
  19     1548
D: 19 1518   _____________________________________
T: 19 1518   Tanner Castañeda MD, [facility]     /EPI

## 2019-05-16 NOTE — NUR
ASSUMMED CARE OF PT AT 0730, PT ALERT AND ORIENTED, FORGETFUL, TRANSFERS WITH
MOD ASSIST OF 1, GB WALKER, NEEDS LIFTING ASSIST, COMPLAINS OF PAIN IN RIGHT
HIP, MEDICATED PER ORDER, PT PULSE NOTED TO BE 41 THIS AM, RECHECKED AND PULSE
IS 48, NOTED THAT PT HAS HAD OCCASIONAL LOW PULSES BUT AT OTHER TIMES 50-70
RANGE, DR BANKS INFORMED ON ROUNDS, EKG AND CARDIOLOGY CONSULT ORDERED, EKG
ABNORMAL WITH BIGEMINAL PVC, CARDIOLOGY NURSE NOTIFIED AND HERE TO SEE PT,
VSS, PT DENIES DIZZINESS, PT VOIDS PER URINAL/TOILET, PROPELLED WHEELCHAIR TO
DININGROOM FOR LUNCH, PARTICIPATED IN ALL THERAPIES, HOURLY ROUNDING
COMPLETED, ASSESSMENT COMPLETE, WILL CONTINUE TO MONITOR.

## 2019-05-17 VITALS — DIASTOLIC BLOOD PRESSURE: 66 MMHG | SYSTOLIC BLOOD PRESSURE: 126 MMHG

## 2019-05-17 VITALS — SYSTOLIC BLOOD PRESSURE: 113 MMHG | DIASTOLIC BLOOD PRESSURE: 51 MMHG

## 2019-05-17 VITALS — DIASTOLIC BLOOD PRESSURE: 51 MMHG | SYSTOLIC BLOOD PRESSURE: 113 MMHG

## 2019-05-17 VITALS — DIASTOLIC BLOOD PRESSURE: 57 MMHG | SYSTOLIC BLOOD PRESSURE: 133 MMHG

## 2019-05-17 NOTE — NUR
SW provided referral for home modifications such as ramp and stair lift. SW
explained to pt that although goal is stairs, pt and pt family may also want
to keep home modifications in mind if needed at PA.

## 2019-05-17 NOTE — NUR
SLEPT MUCH OF THE NIGHT EXCEPT TO VOID. VOIDS PER URINAL. ASSISTED WITH TURNS
UNTIL HE REFUSED FURTHER TURNS. NO C/O PAIN. Q4H NEURO/VS DONE, NO CHANGE. ALL
WNL. HOURLY ROUNDS CONTINUE. BED ALARM ON. CALL LITE IN REACH.

## 2019-05-17 NOTE — 2DMMODE
[place] [address]
Phone:  [phone] 2 D/M-MODE ECHOCARDIOGRAM     
_______________________________________________________________________________
 
Name:         GIANNA ALFREDO          Room:          [address] [place] 
M.R.#:    [id number]     Account #:     [id number]  
Admission:    05/10/19    Attend Phys:   Mohsen Tahani, MD 
Discharge:                Date of Birth: [birth year]  
Date of Service: 19 1228  Report #:      [id number]
        [id number]
_______________________________________________________________________________
THIS REPORT FOR:  //name//                      
 
 
--------------- APPROVED REPORT --------------
 
 
Study performed:  2019 10:34:17
 
EXAM: Comprehensive 2D, Doppler, and color-flow 
Echocardiogram 
Patient Location: In-Patient   
 
      BSA:         1.67
HR: 90 bpm BP:          113/51 mmHg 
Rhythm: NSR     
 
Other Information 
Study Quality: Excellent
 
Indications
Arrhythmia
hx cva
 
2D Dimensions
IVSd:  11.13 (7-11mm) LVOT Diam:  21.02 (18-24mm) 
LVDd:  51.31 mm  
PWd:  9.71 (7-11mm) Ascending Ao:  37.46 (22-36mm)
LVDs:  26.24 (25-40mm) 
Aortic Root:  33.21 mm 
 
Volumes
Left Atrial Volume (Systole) 
    LA ESV Index:  30.00 mL/m2
 
Aortic Valve
AoV Peak Diego.:  1.98 m/s 
AO Peak Gr.:  15.63 mmHg LVOT Max PG:  3.08 mmHg
AO Mean Gr.:  7.44 mmHg  LVOT Mean P.53 mmHg
    LVOT Max V:  0.88 m/s
AO V2 VTI:  32.30 cm  LVOT Mean V:  0.57 m/s
ASIA (VTI):  1.95 cm2  LVOT V1 VTI:  18.14 cm
 
Mitral Valve
    E/A Ratio:  0.82
    MV Decel. Time:  215.48 ms
MV E Max Diego.:  0.73 m/s 
 
 
[place] [address]
Phone:  [phone]                     2 D/M-MODE ECHOCARDIOGRAM     
_______________________________________________________________________________
 
Name:         GIANNA ALFREDO          Room:          [address] [place] 
..#:    [id number]     Account #:     [id number]  
Admission:    05/10/19    Attend Phys:   Mohsen Tahani, MD 
Discharge:                Date of Birth: [birth year]  
Date of Service: 19 1228  Report #:      [id number]
        [id number]
_______________________________________________________________________________
MV PHT:  62.49 ms  
MVA (PHT):  3.52 cm2  
 
TDI
 E/Medial E':  10.43
 Medial E' Diego.:  0.07 m/s
 
Pulmonary Valve
PV Peak Diego.:  1.00 m/s PV Peak Gr.:  4.02 mmHg
 
Tricuspid Valve
    RAP Estimate:  5.00 mmHg
TR Peak Gr.:  23.07 mmHg RVSP:  28.00 mmHg
    PA Pressure:  28.00 mmHg
 
Left Ventricle
The left ventricle is normal size. There is normal LV segmental wall 
motion. There is normal left ventricular wall thickness. Left 
ventricular systolic function is normal. The left ventricular 
ejection fraction is within the normal range. LVEF is 55-60%. Grade I 
- abnormal relaxation pattern.
 
Right Ventricle
The right ventricle is normal size. The right ventricular systolic 
function is normal.
 
Atria
The left atrium size is normal. The right atrium size is 
normal.
 
Aortic Valve
Mild aortic valve sclerosis. Trace aortic regurgitation. No 
hemodynamically significant valvular aortic stenosis.
 
Mitral Valve
The mitral valve is normal in structure. Trace mitral regurgitation. 
No evidence of mitral valve stenosis.
 
Tricuspid Valve
The tricuspid valve is normal in structure. Mild tricuspid 
regurgitation. No pulmonary hypertension.
 
Pulmonic Valve
The pulmonary valve is normal in structure. Mild pulmonic 
regurgitation.
 
 
 
[place] [address]
Phone:  [phone]                     2 D/M-MODE ECHOCARDIOGRAM     
_______________________________________________________________________________
 
Name:         GIANNA ALFREDO          Room:          [address] [place] 
[facility]#:    [id number]     Account #:     [id number]  
Admission:    05/10/19    Attend Phys:   Mohsen Tahani, MD 
Discharge:                Date of Birth: [birth year]  
Date of Service: 19 1228  Report #:      [id number]
        [id number]
_______________________________________________________________________________
Great Vessels
The aortic root is normal in size. IVC is normal in size and 
collapses >50% with inspiration.
 
Pericardium
There is no pericardial effusion.
 
<Conclusion>
The left ventricle is normal size.
Left ventricular systolic function is normal.
The left ventricular ejection fraction is within the normal 
range.
LVEF is 55-60%.
Grade I - abnormal relaxation pattern.
The right ventricle is normal size.
The left atrium size is normal.
Mild aortic valve sclerosis.
Trace aortic regurgitation.
No hemodynamically significant valvular aortic stenosis.
The mitral valve is normal in structure.
The tricuspid valve is normal in structure.
IVC is normal in size and collapses >50% with inspiration.
There is no pericardial effusion.
There is normal LV segmental wall motion.
 
 
 
 
 
 
 
 
 
 
 
 
 
 
 
 
 
 
 
 
  <ELECTRONICALLY SIGNED>
                                           By: Dandre Taylor MD, [facility]     
  19     1228
D: 19 1228   _____________________________________
T: 19 1228   Dandre Taylor MD, FACC       /INF

## 2019-05-17 NOTE — NUR
RESTING IN BED WATCHING TV AND TALKING ON CELL.  PRN ULTRAM GIVEN PER REQUEST
FOR COMPLAINT OF LEFT HIP PAINT RATED "7".  TOOK MEDS WHOLE ONE AT A TIME WITH
WATER.

## 2019-05-17 NOTE — NUR
ASSUMED CARE AT 1930. RESTING IN RECLINER. C/O NEEDING TO HAVE BM. UP WITH MAX
ASSIST OF ONE, GAIT BELT STAND PIVOT TO W/C. ABLE TO GET ON TOILET WITH RISER
FROM W/C USING GRAB BAR WITH MAX ASSIST OF ONE. PATIENT DID NOT HAVE BM. TOOK
TWO STAFF TO ASSIST PATIENT FROM TOILET/RISER TO W/C, THEN THREE PEOPLE TO GET
PATIENT BACK TO BED FROM W/C. PATIENT STATES HE COULD NOT STAND ON HIS LEG.
NEURO SIGNS CHECKED, PERRLA, HAND  EQUAL. TURNS SIDE TO SIDE PER SELF.
TAKES PILLS WHOLE WITH WATER. PULSE CHECKED MANUALLY, IRREGULAR, BUT 71 WHEN
TAKEN OR 60 SECONDS. HOURLY ROUNDS CONTINUE. BED ALARM  ON. CALL LITE IN
REACH.

## 2019-05-17 NOTE — NUR
ASSUMED CARE AT 0730. ALERT ORIENTED PLEASANT COOPERATIVE.  HX OF CVA L SIDE
WEAKNESS.  TRANSFERS VERY SLOWLY AND NEEDS CUEING WITH TRANSFERS.
PARTICIPATING IN THERAPIES THROUGHOUT THE DAY.  MEDICATED X 2 ONCE WITH
TYLENOL AND TRAMADOL X 1 FOR HIP PAIN WITH SOME RELIEF STATED.  USES CALL
LIGHT APPROPRIATELY FOR ASSISTANCE.  FEEDS SELF TAKES MEDS ONE AT A TIME
APPETITE GOOD.  HAS BEEN UP IN CHAIR AT BEDSIDE WHEN NOT IN THERAPIES.  SON
VISITING THIS AFTERNOON.  VOIDED X 2 IN BR TOILET.

## 2019-05-18 VITALS — SYSTOLIC BLOOD PRESSURE: 175 MMHG | DIASTOLIC BLOOD PRESSURE: 64 MMHG

## 2019-05-18 VITALS — SYSTOLIC BLOOD PRESSURE: 110 MMHG | DIASTOLIC BLOOD PRESSURE: 64 MMHG

## 2019-05-18 VITALS — SYSTOLIC BLOOD PRESSURE: 126 MMHG | DIASTOLIC BLOOD PRESSURE: 76 MMHG

## 2019-05-18 NOTE — NUR
AWAKENED FOR HS REASSESSMENT AND MEDICATION PASS.  PAIN MED GIVEN FOR
COMPLAINT IN BOTH HIPS RATED "8".  TOOK MEDICATIONS WHOLE ONE AT A TIME WITH
WATER.  URINAL AND CALL LIGHT WITHIN REACH.

## 2019-05-18 NOTE — NUR
ASSUMMED CARE OF PT AT 0730, PT ALERT AND ORIENTED, FORGETFUL, HAS HAD
DIFFICULT TIME TRANSFERRING FROM BED TO CHAIR AND FROM CHAIR TO BED, UNABLE TO
PUSH UP AND STAND, AND ONCE ASSISTED TO STAND, LEANS BACK, STIFF, NEEDED TO
HAVE MAX ASSIST OF 2 TO TRANSFER. TAKING FOOD AND FLUIDS WELL, PT COMPLAINS OF
BILATERAL HIP PAIN, MEDICATED PER ORDER, PROPELLED HIMSELF BACK FROM
DININGROOM AFTER LUNCH, PT VOIDS PER URINAL, STATES HE HAD BM YESTERDAY,
PARTICIPATED IN ALL THERAPIES, HOURLY ROUNDING COMPLETED, ASSESSMENT COMPLETE,
WILL CONTINUE TO MONITOR.

## 2019-05-19 VITALS — DIASTOLIC BLOOD PRESSURE: 69 MMHG | SYSTOLIC BLOOD PRESSURE: 123 MMHG

## 2019-05-19 VITALS — DIASTOLIC BLOOD PRESSURE: 62 MMHG | SYSTOLIC BLOOD PRESSURE: 144 MMHG

## 2019-05-19 NOTE — NUR
RESTING QUIETLY IN BED.  PAIN MEDICATION GIVEN FOR COMPLAINT OF LEFT HIP PAIN
RATED "7".  TOOK MEDICATION WHOLE ONE AT A TIME WITH WATER.

## 2019-05-19 NOTE — NUR
RESTED QUIETLY.  USED URINAL X 2 DURING THE NIGHT.  NO FURTHER COMPLAIN OF
PAIN.  HOURLY ROUNDING IN PROGRESS.

## 2019-05-19 NOTE — NUR
ASSUMMED CARE OF PT AT 0730, PT ALERT AND ORIENTED, SPEECH DYSARTHRIC, PT
TRANSFERRED WITH MINIMAL ASSIST THIS AM WITH GB WALKER FROM BED TO CHAIR, PT
REFUSED BATH, BUT DID DO GROOMING WITH SET UP, PT REFUSED TO CHANGE CLOTHES
THIS AM, PT DID ASSIST WITH BRIEF CHANGE.  WHEN ATTEMPTED TO TRANSFER FROM
CHAIR TO WHEELCHAIR PRIOR TO LUNCH PT ATTEMPTED 5 TIMES BEFORE HE WAS ABLE TO
STAND, THAN TOOK ABOUT 5 0R 6 SHUFFLE STEPS AND HIS KNEES GAVE OUT AND STAFF
GOT W/C UNDER HIM, AFTER LUNCH WHEN ATTEMPTING TO GO FROM W/C TO BED PT UNABLE
TO STAND OR GET HIS BUTTUCKS OFF THE CHAIR FOR MORE THAN A FEW INCHES, LIFT
USE TO GET PT BACK TO BED, PT COMPLAINS OF BACK PAIN THIS AM, MEDICATED PER
ORDER, PT TAKING FOOD AND FLUIDS WELL, HAD LUNCH IN DININGROOM,VOIDS PER
URINAL. HOURLY ROUNDING COMPLETED, ASSESSMENT COMPLETE, WILL CONTINUE TO
MONITOR.

## 2019-05-20 VITALS — DIASTOLIC BLOOD PRESSURE: 87 MMHG | SYSTOLIC BLOOD PRESSURE: 115 MMHG

## 2019-05-20 VITALS — SYSTOLIC BLOOD PRESSURE: 103 MMHG | DIASTOLIC BLOOD PRESSURE: 57 MMHG

## 2019-05-20 NOTE — NUR
ASSUMMED CARE OF PT AT 0730, PT ALERT AND ORIENTED, PT TRANSFERS FROM MIN
ASSIST TO TOTAL ASSIST, HE MOVES SLOW WITH FREQUENT CUEING STANDS
AND AMBULATED TO BATHROOM, AND AT OTHER TIMES NEEDS ASSIST OF 2 OR LIFT TO
TRANSFER PT, PT VOIDS PER URINAL, PASSING FLATUS BUT NO BM THIS SHIFT, TAKING
FOOD AND FLUIDS WELL, COMPLAINS OF BACK PAIN THIS AM AND RIGHT LEG PAIN THIS
PM, MEDICATED PER ORDER, PARTICIPATED IN ALL THERAPIES, HOURLY ROUNDING
COMPLETED, ASSESSMENT COMPLETE, WILL CONTINUE TO MONITOR.

## 2019-05-20 NOTE — NUR
RESTING QUIELTY IN BED.  TRAMADOL GIVEN PER REQUEST FOR COMPLAINT OF BACK AND
RIGHT LEG PAIN RATED "8".  CALL LIGHT AND URINAL WITHIN REACH.

## 2019-05-21 VITALS — DIASTOLIC BLOOD PRESSURE: 64 MMHG | SYSTOLIC BLOOD PRESSURE: 128 MMHG

## 2019-05-21 VITALS — DIASTOLIC BLOOD PRESSURE: 61 MMHG | SYSTOLIC BLOOD PRESSURE: 149 MMHG

## 2019-05-21 NOTE — NUR
RESTED SOUNDLY.  VOIDED PER URINAL THIS MORNING.  NO FURTHER COMPLAINT OF
PAIN.  HOURLY ROUNDING IN PROGRESS.

## 2019-05-21 NOTE — NUR
ABIGAIL met with pt and pt son in preparation for team conference.  SW discussed
concerns and questions regarding pt ability and home situation and safe dc
planning. Pt son encouraging pt but also stating that if pt unable to regain
some independence with mobility and ADLs, pt may have to consider alternate
living arrangements.  Pt son explained that the home would not be able to be
modified to arrange for ramp, wc, stair lift, etc.  Pt understood and was
perseverating on not being able to pull his pants up over his bottom; but that
he thought he would be able to practice walking and stairs.  SW
explained team's recommendations and how if pt does not show ability
to safely perform mobility tasks, pt would not be able to practice
stairs. SW provided encouragement and to continue to follow to assist with
safe dc planning.

## 2019-05-21 NOTE — NUR
PT HAS REFUSED TO STAND TODAY, ALSO REFUSES TO CUT THE FOOD ON HIS TRAY AND
SAYS HE CAN'T HELP WITH REPOSITIONING IN BED. PT IS ABLE TO FEED HIMSELF AND
USES THE CALL LIGHT FREQUENTLY FOR THINGS HE COULD DO FOR HIMSELF BUT WON'T.
FALL PRECAUTIONS AND HOURLY ROUNDING CONTINUE.

## 2019-05-22 VITALS — SYSTOLIC BLOOD PRESSURE: 142 MMHG | DIASTOLIC BLOOD PRESSURE: 61 MMHG

## 2019-05-22 VITALS — DIASTOLIC BLOOD PRESSURE: 52 MMHG | SYSTOLIC BLOOD PRESSURE: 108 MMHG

## 2019-05-22 LAB
ALBUMIN SERPL-MCNC: 3.2 G/DL (ref 3.4–5)
ALP SERPL-CCNC: 112 U/L (ref 46–116)
ALT SERPL-CCNC: 28 U/L (ref 30–65)
ANION GAP SERPL CALC-SCNC: 10 MMOL/L (ref 7–16)
AST SERPL-CCNC: 20 U/L (ref 15–37)
BILIRUB SERPL-MCNC: 0.4 MG/DL
BILIRUB UR-MCNC: NEGATIVE MG/DL
BUN SERPL-MCNC: 24 MG/DL (ref 7–18)
CALCIUM SERPL-MCNC: 9.2 MG/DL (ref 8.5–10.1)
CHLORIDE SERPL-SCNC: 105 MMOL/L (ref 98–107)
CO2 SERPL-SCNC: 27 MMOL/L (ref 21–32)
COLOR UR: YELLOW
CREAT SERPL-MCNC: 0.8 MG/DL (ref 0.6–1.3)
GLUCOSE SERPL-MCNC: 137 MG/DL (ref 70–99)
KETONES UR STRIP-MCNC: NEGATIVE MG/DL
POTASSIUM SERPL-SCNC: 3.7 MMOL/L (ref 3.5–5.1)
PROT SERPL-MCNC: 7.3 G/DL (ref 6.4–8.2)
PROT UR QL STRIP: NEGATIVE
RBC # UR STRIP: NEGATIVE /UL
SODIUM SERPL-SCNC: 142 MMOL/L (ref 136–145)
SP GR UR STRIP: 1.01 (ref 1–1.03)
URINE CLARITY: CLEAR
URINE GLUCOSE-RANDOM: NEGATIVE
URINE LEUKOCYTES-REFLEX: NEGATIVE
URINE NITRITE-REFLEX: NEGATIVE
UROBILINOGEN UR STRIP-ACNC: 0.2 E.U./DL (ref 0.2–1)

## 2019-05-22 NOTE — NUR
RESTED QUIETLY, BUT DID NOT SLEEP WELL. C/O CONSTIPATION. MANUALLY CHECKED,
SOME STOOL IN LOWER RECTUM THAT WAS SOFT ENOUGH TO PASS. SMALL AMOUNT REMOVED
MANUALLY. SUPPOSITORY GIVEN, LATER INCONTINENT OF LARGE AMOUNT OF SOFT LIGHT
BROWN STOOL. GIVEN MOM. LATER C/O THAT HE STILL HAD STOOL, OBSERVED SOME STOOL
BEING PASSED. PATIENT C/O HE COULDN'T PASS ANY MORE STOOL. LARGE AMOUMT OF
SOFT, FORMED LIGHT BROWN STOOL MANUALLY REMOVED BY THIS NURSE. PATIENT PASSING
FLATUS AFTERWARDS. INCONTINENT OF URINE WHILE VOIDING WITH ATTEMPTS TO HAVE
BM. PATIENT THOUGHT HE HAD TO STAY IN BED TODAY BECAUSE HE HAD TAKEN MOM.
PATIENT INSTRUCTED THAT HE WAS TO HAVE HIS REGULAR THREE HOURS OF THERAPY AND
MOVING AROUND WITH THERAPY WOULD BE BENEFICIAL IN RELIEVING CONSTIPATION.
HOURLY ROUNDS CONTINUE. BED ALARM ON. CALL LITE IN REACH.

## 2019-05-22 NOTE — NUR
AWAKENED FOR HS REASSESSMENT AND MEDICATION PASS.  DENIES DISCOMFORT.  TOOK
MEDICATIONS WHOLE ONE AT A TIME WITH WATER.  CALL LIGHT AND URINAL WITHIN
REACH.

## 2019-05-22 NOTE — NUR
SW met with pt and pt son to review team conference summary and plan for pt to
remain on rehab unit one more week as pt did show some progress in therapies
and SW mentioned pt will need to continue to show progress towards goals.  Pt
and pt son in agreement with plan.  SW to continue to follow to assist with
safe dc planning.

## 2019-05-22 NOTE — NUR
ASSUMED CARE AT 1930. PATIENT RESTING IN BED. TAKES PILLS WHOLE WITH WATER ONE
AT A TIME. ASSISTS WITH TURN UPON REQUEST. DID REACH FOR HAND HOLDS AT TOP OF
BED TO PULL HIMSELF UP, BUT HE WAS TOO FAR DOWN. DID ASSIST BY BENDING LEGS
AND PUSHING UP WHILE TWO NURSES PULLED HIM UP IN BED. U/A SENT TO LAB, RESULTS
PENDING. WAS INCONTINENT OF URINE INTO BRIEF. BRIEF REMOVED AT HS.  NO C/O
PAIN. HOURLY ROUNDS CONTINUE. BED ALARM ON. CALL LITE IN REACH.

## 2019-05-23 VITALS — SYSTOLIC BLOOD PRESSURE: 127 MMHG | DIASTOLIC BLOOD PRESSURE: 76 MMHG

## 2019-05-23 VITALS — SYSTOLIC BLOOD PRESSURE: 118 MMHG | DIASTOLIC BLOOD PRESSURE: 55 MMHG

## 2019-05-23 NOTE — NUR
PT VSS THIS SHIFT WITH LITTLE C/O PAIN THIS SHIFT. PT CAN BE FORGETFUL AND IS
STRUGGLING WITH FATIGUE AND WEAKNESS THIS SHIFT AND IS REQUIRING THE
ASSISTANCE OF 2 STAFF MEMBERS TO AMBULATE THIS SHIFT. HOURLY ROUNDING
MAINTAINED THIS SHIFT AND PT REMINDED FREQUENTLY TO SHIFT WEIGHT WHILE SITTING
BETWEEN THERAPIES. PT TOLERATING RA AND DIET WITH NO CONCERNS THIS SHIFT. WILL
CONTINUE TO MONITOR AND ASSESS.

## 2019-05-23 NOTE — NUR
INCONTINENT OF STOOL X ONE DURING THE NIGHT.  SVEN CARE GIVEN.  USED THE
URINAL DURING THE NIGHT.  HOURLY ROUNDING IN PROGRESS.

## 2019-05-24 VITALS — SYSTOLIC BLOOD PRESSURE: 168 MMHG | DIASTOLIC BLOOD PRESSURE: 85 MMHG

## 2019-05-24 VITALS — DIASTOLIC BLOOD PRESSURE: 67 MMHG | SYSTOLIC BLOOD PRESSURE: 130 MMHG

## 2019-05-24 NOTE — NUR
ASSUMMED CARE OF PT AT 0730, PT ALERT AND ORIENTED, PT TRANSFERS WITH MIN
ASSIST GB WALKER FROM BED TO CHAIR THIS AM, AMBULATED TO BATHROOM X 1, GAIT
SLOW, SHUFFLES, COMPLAINS OF BACK PAIN, LIDOCAINE PATCH APPLIED, REFUSES PAIN
PILL THIS SHIFT, HAD SMALL INCONTINENT STOOL IN BRIEF THIS AM, TAKING FOOD AND
FLUIDS WELL, PARTICIPATED IN ALL THERAPIES, HOURLY ROUNDING COMPLETED,
ASSESSMENT COMPLETE, WILL CONTINUE TO MONITOR.

## 2019-05-25 VITALS — SYSTOLIC BLOOD PRESSURE: 146 MMHG | DIASTOLIC BLOOD PRESSURE: 53 MMHG

## 2019-05-25 VITALS — SYSTOLIC BLOOD PRESSURE: 127 MMHG | DIASTOLIC BLOOD PRESSURE: 63 MMHG

## 2019-05-25 NOTE — NUR
ASSUMED PT CARE AT 1930, POLITE AND COOPERATIVE WITH CARES.  TRANSFERS WITH
MIN ASSIT, GAIT BELT AND WALKER.  NO C/O PAIN.  TAKES PILLS WHOLE WITH WATER
ONE AT A TIME.  NO STOOL THIS SHIFT.  USES CALL LIGHT APPROPRIATELY.  CALL
LIGHT AND FREQUENTLY USED ITEMS WITHIN REACH.  HOURLY ROUNDING IN PROGRESS,
WILL CONTINUE TO MONITOR.

## 2019-05-25 NOTE — NUR
ASSUMED CARE AT 0730.  ALERT ORIENTED PLEASANT COOPERATIVE.  HX OF CVA L
WEAKNESS.  TRANSFERS WITH MIN SBA G BELT WALKER AMBULATED TO BR HAD LARGE BM
ABLE TO DO HYGEINE AND CLOTHING ADJUSTMENTS.  FEEDS SELF WITH SET UP.
APPETITE GOOD TAKES MEDS WITHOUT DIFFICULTY 1 AT A TIME WITH SIPS OF WATER.
USES CALL LIGHT APPROPRIATELY FOR ASSISTANCE.  PARTICIPATES IN THERAPIES
THROUGHOUT THE DAY.

## 2019-05-26 VITALS — DIASTOLIC BLOOD PRESSURE: 59 MMHG | SYSTOLIC BLOOD PRESSURE: 117 MMHG

## 2019-05-26 VITALS — SYSTOLIC BLOOD PRESSURE: 138 MMHG | DIASTOLIC BLOOD PRESSURE: 52 MMHG

## 2019-05-26 NOTE — NUR
ASSUMED CARE AT 1930. RESTING IN BED. TURNS SELF. TAKES PILLS WITH WATER.
VOIDS PER URINAL. NO C/O PAIN. HOURLY ROUNDS CONTINUE. BED ALARM ON. CALL LITE
IN REACH.

## 2019-05-26 NOTE — NUR
ASSUMED CARE AT 0730.  ALERT ORIENTED PLEASANT AND COOPERATIVE.  HX OF CVA
WEAKNESS L SIDE.  TRANSFERS WITH 1 ASSIST WITH G BELT WALKER AND CUEING FROM
BED TO CHAIR.  FEEDS SELF TAKES MEDS 1 AT A TIME.  USES TOILET FOR BM THIS
A.M. VOIDED X 1 PER URINAL.  NO C/O PAIN USES CALL LIGHT APPROPRIATELY FOR
ASSIST.  TO  FOR LUNCH PER W/C.

## 2019-05-26 NOTE — NUR
ASSUMED PT CARE AT 1930.  PT ALERT AND ORIENTED X4, POLITE AND COOPERATIVE
WITH CARES.  HX OF CVA WITH LEFT SIDED WEAKNESS.  PT TRANSFERS WITH SBA, GAIT
BELT AND WALKER.  NO STOOL THIS SHIFT.  USED URINAL AND STAFF EMPTIED.  USES
CALL LIGHT APPROPRIATELY. TAKES PILLS WHOLE ONE AT A TIME WITH WATER.  CALL
LIGHT AND FREQUENTLY USED ITEMS WITHIN REACH.  HOURLY ROUNDING IN PROGRESS,
WILL CONTINUE TO MONITOR.

## 2019-05-27 VITALS — DIASTOLIC BLOOD PRESSURE: 58 MMHG | SYSTOLIC BLOOD PRESSURE: 139 MMHG

## 2019-05-27 VITALS — DIASTOLIC BLOOD PRESSURE: 58 MMHG | SYSTOLIC BLOOD PRESSURE: 114 MMHG

## 2019-05-27 LAB
ANION GAP SERPL CALC-SCNC: 11 MMOL/L (ref 7–16)
BUN SERPL-MCNC: 23 MG/DL (ref 7–18)
CALCIUM SERPL-MCNC: 8.8 MG/DL (ref 8.5–10.1)
CHLORIDE SERPL-SCNC: 109 MMOL/L (ref 98–107)
CO2 SERPL-SCNC: 26 MMOL/L (ref 21–32)
CREAT SERPL-MCNC: 0.7 MG/DL (ref 0.6–1.3)
GLUCOSE SERPL-MCNC: 106 MG/DL (ref 70–99)
MAGNESIUM SERPL-MCNC: 2.2 MG/DL (ref 1.8–2.4)
POTASSIUM SERPL-SCNC: 3.7 MMOL/L (ref 3.5–5.1)
SODIUM SERPL-SCNC: 146 MMOL/L (ref 136–145)

## 2019-05-27 NOTE — NUR
ASSUMMED CARE OF PT AT 0730, PT TRANSFERS WITH MOD ASSIST GB WALKER,CUEING,
NEEDS SLIGHT LIFTING ASSIST, PT COMPLAINS OF LOW BACK PAIN, LIDOCAINE PATCH
APPLIED, NO REQUEST FOR FURTHER PAIN MEDICATION, PT VOIDS PER URINAL/TOILET,
TAKING FOOD AND FLUIDS WELL, TO DININGROOM FOR LUNCH, PROPELLED W/C BACK TO
ROOM INDEPENDENTLY, PARTICIPATED IN ALL THERAPIES, HOURLY ROUNDING COMPLETED,
ASSESSMENT COMPLETE, WILL CONTINUE TO MONITOR.

## 2019-05-27 NOTE — NUR
SLEPT MOST OF THE NIGHT EXCEPT TO USE URINAL. TURNS SELF. NO C/O PAIN. HOURLY
ROUNDS CONTINUE. BED ALARM ON. CALL LITE IN REACH.

## 2019-05-28 VITALS — SYSTOLIC BLOOD PRESSURE: 109 MMHG | DIASTOLIC BLOOD PRESSURE: 56 MMHG

## 2019-05-28 VITALS — SYSTOLIC BLOOD PRESSURE: 145 MMHG | DIASTOLIC BLOOD PRESSURE: 70 MMHG

## 2019-05-28 NOTE — NUR
ASSUMED PT CARE AT 1930. PT ALERT AND ORIENTED X4, POLITE AND COOPERATIVE WITH
CARES.  HX OF CVA WITH LEFT SIDED WEAKNESS.  PT TRANSFERS WITH SBA, GAIT BELT
AND WALKER.  DENIES PAIN. NO STOOL THIS SHIFT.  USES URINAL, STAFF EMPTIES.
TAKES PILLS WHOLE ONE A TIME WITH WATER. USES CALL LIGHT APPROPRIATELY. CALL
LIGHT AND FREQUENTLY USED ITEMS WITHIN REACH.  HOURLY ROUNDING IN PROGRESS,
WILL CONTINUE TO [facility].

## 2019-05-28 NOTE — NUR
ASSUMMED CARE OF PT AT 0730, PT ALERT AND ORIENTED, TRANSFERS WITH MOD ASSIST,
GB WALKER, NEEDS LIFTING ASSIST, TAKING FOOD AND FLUIDS WELL, VOIDS PER
URINAL/TOILET, COMPLAINS OF LOW BACK PAIN, LIDOCAINE PATCH APPLIED, PT
ENCOURAGED TO DRINK FLUIDS, PROPELS SELF IN W/C TO DININGROOM AND BACK,
PARTICIPATED IN ALL THERAPIES, HOURLY ROUNDING COMPLETED, ASSESSMENT COMPLETE,
WILL CONTINUE TO MONITOR.

## 2019-05-29 VITALS — SYSTOLIC BLOOD PRESSURE: 106 MMHG | DIASTOLIC BLOOD PRESSURE: 59 MMHG

## 2019-05-29 VITALS — SYSTOLIC BLOOD PRESSURE: 119 MMHG | DIASTOLIC BLOOD PRESSURE: 55 MMHG

## 2019-05-29 LAB
ANION GAP SERPL CALC-SCNC: 9 MMOL/L (ref 7–16)
BUN SERPL-MCNC: 27 MG/DL (ref 7–18)
CALCIUM SERPL-MCNC: 9.2 MG/DL (ref 8.5–10.1)
CHLORIDE SERPL-SCNC: 105 MMOL/L (ref 98–107)
CO2 SERPL-SCNC: 28 MMOL/L (ref 21–32)
CREAT SERPL-MCNC: 0.7 MG/DL (ref 0.6–1.3)
GLUCOSE SERPL-MCNC: 122 MG/DL (ref 70–99)
MAGNESIUM SERPL-MCNC: 2.4 MG/DL (ref 1.8–2.4)
POTASSIUM SERPL-SCNC: 4.1 MMOL/L (ref 3.5–5.1)
SODIUM SERPL-SCNC: 142 MMOL/L (ref 136–145)

## 2019-05-29 NOTE — NUR
ASSUMED CARE AT 0730.  ALERT ORIENTED PLEASANT COOPERATIVE.  HX OF CVA L SIDE
WEAKNESS.  TRANSFERS TODAY MAX ASSISTOF 2 AS PT. IS RETROPULSIVE AND IS
SITTING IN W/C. DIFFICULT TO GET UP TO STAND AND USE WALKER FOR AMBULATION.
FEEDS SELF WITH SET UP. TAKES MEDS 1 AT A TIME WITH WATER WITHOUT DIFFICULTY.
USES CALL LIGHT APPROPRIATELY FOR ASSISTANCE.  PARTICIPATING IN THERAPIES.
MEDICATED WITH PRN TRAMADOL FOR C/O BACK PAIN THIS AFTERNOON.  SON HERE
VISITING AT LUNCH TIME.

## 2019-05-29 NOTE — NUR
ASSUMED CARE AT 1930. PATIENT RESTING IN BED. VOIDS PER URINAL, NURSING
EMPTIES. TAKES PILLS WHOLE WITH WATER ONE AT A TIME. TURNS SELF. LIDO PATCH
REMOVED AT HS. DENIES PAIN. HOURLY ROUNDS CONTINUE. BED ALARM ON. CALL LITE IN
REACH.

## 2019-05-29 NOTE — NUR
ABIGAIL and Dr Canales met with pt and pt son to review team conference summary and
plan for one more week of therapies on inpt rehab unit with possibility of dc
next Wednesday 6/5.  To be determined if possible/safe for pt to dc home with
pt son, SW explained recommendation of ample family training sessions with pt
son to provide ideas as to whether or not pt son felt that he would be able to
provide pt the care needed at dc.  Otherwise, pt and pt family may consider
LTC as was discussed with pt and pt son last week.  SW to continue to follow
to assist with safe dc planning.

## 2019-05-29 NOTE — NUR
ASSUMED PT CARE AT 1930.  PT ALERT AND ORIENTED X4.  HX OF CVA WITH LEFT SIDED
WEAKNESS.  PT ALREADY IN BED AT SHIFT CHANGE.  DNEIES PAIN.  NO STOOL THIS
SHIFT.  USES URINAL, STAFF EMPTIES.  TAKES PILLS WHOLE ONE AT A TIME WITH
WATER.  USES CALL LIGHT APPROPRIATELY.  CALL LIGHT AND FREQUENTLY USED ITEMS
WITHIN REACH.  HOURLY ROUNDING IN PROGRESS, WILL CONTINUE TO MONITOR.

## 2019-05-30 VITALS — SYSTOLIC BLOOD PRESSURE: 131 MMHG | DIASTOLIC BLOOD PRESSURE: 65 MMHG

## 2019-05-30 VITALS — DIASTOLIC BLOOD PRESSURE: 65 MMHG | SYSTOLIC BLOOD PRESSURE: 130 MMHG

## 2019-05-30 VITALS — DIASTOLIC BLOOD PRESSURE: 62 MMHG | SYSTOLIC BLOOD PRESSURE: 124 MMHG

## 2019-05-30 VITALS — SYSTOLIC BLOOD PRESSURE: 126 MMHG | DIASTOLIC BLOOD PRESSURE: 72 MMHG

## 2019-05-30 VITALS — DIASTOLIC BLOOD PRESSURE: 72 MMHG | SYSTOLIC BLOOD PRESSURE: 126 MMHG

## 2019-05-30 LAB
ANION GAP SERPL CALC-SCNC: 11 MMOL/L (ref 7–16)
BUN SERPL-MCNC: 23 MG/DL (ref 7–18)
CALCIUM SERPL-MCNC: 9 MG/DL (ref 8.5–10.1)
CHLORIDE SERPL-SCNC: 104 MMOL/L (ref 98–107)
CO2 SERPL-SCNC: 26 MMOL/L (ref 21–32)
CREAT SERPL-MCNC: 0.8 MG/DL (ref 0.6–1.3)
GLUCOSE SERPL-MCNC: 140 MG/DL (ref 70–99)
POTASSIUM SERPL-SCNC: 3.8 MMOL/L (ref 3.5–5.1)
SODIUM SERPL-SCNC: 141 MMOL/L (ref 136–145)

## 2019-05-30 NOTE — NUR
ASSUMED CARE AT 1930. PATIENT RESTING IN BED. VOIDS PER URINAL. C/O NEEDING
BM. PLACED ON BEDPAN DUE TO PROFOUND WEAKNESS WITH TRANSFERS. NO BM YET. TAKES
PILLS WHOLE WITH WATER. TURNS SELF IN BED. BLOOD PRESSURE IMPROVED FROM DAY
SHIFT. NO C/O N/V. SPEECH AT BASELINE. NO C/O PAIN. HOURLY ROUNDS CONTINUE.
BED ALARM ON. CALL LITE IN REACH.

## 2019-05-30 NOTE — NUR
PT HAD N/V EARLIER IN THE SHIFT WITH HYPOTENSION, TEAM AND DR STILES NOTIFIED,
ORDERS PLACED TO MANAGE CARE.
MELLISSA WITH SPEECH JUST CAME FROM PT ROOM AND STATED THAT THE PT HAD MARKEDLY
SLURRED SPEECH FROM PREVIOUS DAYS AND IT WAS MUCH WORSE THAN THIS MORNING.
DR HOLM NOTIFIED AND OBTAINED VERBAL ORDER TO CHANGE BOLUS TO 1000ML AND
PROVIDE AT THIS TIME AND TO NOTIFY IF SYMPTOMS DO NOT SUBSIDE AND IF BP DOES
NOT IMPROVE. WILL CONTINUE TO MONITOR AND ASSESS

## 2019-05-30 NOTE — NUR
SLEPT MOST OF THE NIGHT EXCEPT TO VOID PER URINAL. TURNS SELF. NO C/O PAIN.
HOURLY ROUNDS CONTINUE. BED ALARM ON. CALL LITE IN REACH.

## 2019-05-31 VITALS — DIASTOLIC BLOOD PRESSURE: 73 MMHG | SYSTOLIC BLOOD PRESSURE: 140 MMHG

## 2019-05-31 VITALS — DIASTOLIC BLOOD PRESSURE: 52 MMHG | SYSTOLIC BLOOD PRESSURE: 118 MMHG

## 2019-05-31 VITALS — SYSTOLIC BLOOD PRESSURE: 140 MMHG | DIASTOLIC BLOOD PRESSURE: 73 MMHG

## 2019-05-31 NOTE — NUR
ASSUMED CARE AT 0730.  ALERT ORIENTED PLEASANT COOPERATIVE.  HX OF CVA AND L
SIDE WEAKNESS.  PARTICIPATING IN THERAPIES O.T. THIS A.M.  TRANSFERS MUCH
BETTER THIS MORNING.  G BELT WALKER MIN ASSISTANCE FROM CHAIR TO STAND WITH
WALKER.  NO NAUSEA OR EMESIS.  TOOK MEDS WITHOUT DIFFICULTY.  MEDICATED X 1
WITH TRAMADOL FOR BACK PAIN.  USES CALL LIGHT APPROPRIATELY FOR ASSISTANCE.
VOIDED IN TOILET WITH O.T. AND HAD A BM ALSO THIS A.M.  TO  FOR MEALS.
APPETITE GOOD.

## 2019-05-31 NOTE — NUR
SW called and spoke with pt son about family training and he said he would
probably be able to be here to complete more family training on Monday at 1
pm.  SW also mentioned that therapy recommending one more person available to
assist with pt going up and down stairs.  SW to continue to follow to assist
with safe dc planning.

## 2019-05-31 NOTE — NUR
SLEPT MOST OF THE NIGHT EXCEPT TO VOID. TURNS SELF. ATTEMPTING BM PER BED PAN.
NO C/O PAIN. HOURLY ROUNDS CONTINUE. BED ALARM ON. CALL LITE IN REACH.

## 2019-06-01 VITALS — SYSTOLIC BLOOD PRESSURE: 135 MMHG | DIASTOLIC BLOOD PRESSURE: 78 MMHG

## 2019-06-01 VITALS — DIASTOLIC BLOOD PRESSURE: 63 MMHG | SYSTOLIC BLOOD PRESSURE: 120 MMHG

## 2019-06-01 NOTE — NUR
PT ALERT AND ORIENTED. VSS ON RA. VOIDED BY URINALS.  MEDS GIVEN PER EMAR. PT
SLEPT MOST OF SHIFT. CALLS OUT FOR URINALS TO BR EMPTIED AFTER EACH VOID. PT
PLEASEANT AND APPROPRIATE. FALL PRECAUTION IN PLACE. CALL LIGHT WITHIN REACH.
WILL CONTINUE TO MONITOR.

## 2019-06-02 VITALS — DIASTOLIC BLOOD PRESSURE: 67 MMHG | SYSTOLIC BLOOD PRESSURE: 145 MMHG

## 2019-06-02 VITALS — DIASTOLIC BLOOD PRESSURE: 60 MMHG | SYSTOLIC BLOOD PRESSURE: 113 MMHG

## 2019-06-02 NOTE — NUR
PT ALERT AND ORIENTED. VSS ON RA. PT SLEPT WELL THIS SHIFT. PT DID NOT GET OUT
OF BED THIS SHIFT. PT USES URINALS TO VOID. NO BM NOTED THIS SHIFT. PT DENIES
N/V/P THIS SHIFT. MEDS GIVEN PER EMAR. CALL LIGHT WITHIN REACH. HOURLY
ROUNDINGS MADE. WILL CONTINUE TO MONITOR.

## 2019-06-02 NOTE — NUR
AT SHIFT CHANGE SITTING UP IN RECLINER WATCHING TV.  WAS ABLE TO SIT UP FROM
RECLINER AND WALKER TO THE TOILET WITH CGA, GAITBELT, WALKER.  ASSISTED
PATIENT WITH PULLING PANTS DOWN.  PATIENT VOIDED.  PATIENT WAS UNABLE TO
STAND UP FROM THE STOOL RISER.  TRANSFERRED PATIENT FROM STOOL RISER TO THE
WHEELCHAIR WITH MAX ASSIST, OF TWO, GAITBELT, LIFTING.  TRANSFERRED FROM
WHEELCHAIR TO BED WITH MAX ASSIST OF 3, GAITBELT, LIFTING.  WHEN BROUGHT
PATIENT HIS MEDICATIONS HE HAD ALREADY FALLEN TO SLEEP.  AWAKENED EASILY THEN
TOOK HIS MEDICATIONS WHOLE ONE AT A TIME WITH WATER.  CALL LIGHT WITHIN REACH.

## 2019-06-02 NOTE — NUR
PATIENT UP IN CHAIR. PATIENT IS UP WITH MINIMAL ASSIST WITH WALKER AND GAIT
BELT. PATIENT IS SLOW TO TRANSFER BUT DOES WELL. PATIENT HAS COMPLAINTS OF
BACK PAIN WITH ADEQUATE RELIEF PROVIDED WITH MEDICATION. PATIENT DENIES ANY
NEEDS AT THIS TIME. CALL LIGHT WITHIN REACH. WILL CONTINUE TO MONITOR.

## 2019-06-03 VITALS — SYSTOLIC BLOOD PRESSURE: 156 MMHG | DIASTOLIC BLOOD PRESSURE: 88 MMHG

## 2019-06-03 VITALS — DIASTOLIC BLOOD PRESSURE: 62 MMHG | SYSTOLIC BLOOD PRESSURE: 135 MMHG

## 2019-06-03 VITALS — DIASTOLIC BLOOD PRESSURE: 72 MMHG | SYSTOLIC BLOOD PRESSURE: 154 MMHG

## 2019-06-03 NOTE — NUR
PT. NEEDED MOD ASSIST FROM SIT TO STAND FROM W/C TO BED HE WAS ABLE TO TAKE
STEPS WITH WALKER AND SIT ON BED AND GET BOTH FEET INTO BED.  STATES HE IS
AFRAID OF FALLING EXPLAINED TO PT. HE HAS GREATER CHANCE OF FALLING BY NOT
WORKING WITH STAFF AND HELPING WITH TRANSFERS.  PT. TURNED HIMSELF TO RT.
SIDE.  CALL LIGHT AND NEEDS PLACED CONVENIENTLY FOR PT.

## 2019-06-03 NOTE — NUR
ASSUMED CARE AT 0800.  ALERT ORIENTED PLEASANT COOPERATIVE.  HX OF CVA L SIDE
WEAKNESS. TRANSFERRED FROM CHAIR TO STANDING POSITION USING GAIT BELT WALKER
WITH MOD ASSIST. AMBULATED SLOW GAIT TO BR TO VOID NO BM C/O NAUSEA GIVEN
ZOFRAN ODT. NO EMESIS BUT STATES HE ISNT HUNGRY AT LUNCH.  AFTER USING TOILET
HE WAS HAVING DIFFICULTY ARISING FROM STOOL.   P.T. WAS ABLE TO GET HIM TO
ARISE AND AMBULATE WITH WALKER AFTER A BRIEF REST.  HE HAD A SMALL BM LAST
NIGHT AND A LARGE BM ON FRIDAY.  STATES HE HAS BEEN PASSING GAS.  SENNA 2 TABS
GIVEN BEFORE LUNCH.  USES CALL LIGHT APPROPRIATELY FOR ASSISTANCE.

## 2019-06-03 NOTE — NUR
Pt son participated in family training today. Pt son planning for pt to return
home on Wednesday 6/5 with  services.  SW to continue to follow to assist
with safe dc planning and finalizing safe dc plan for Wednesday 6/5.

## 2019-06-04 VITALS — DIASTOLIC BLOOD PRESSURE: 64 MMHG | SYSTOLIC BLOOD PRESSURE: 119 MMHG

## 2019-06-04 VITALS — DIASTOLIC BLOOD PRESSURE: 92 MMHG | SYSTOLIC BLOOD PRESSURE: 160 MMHG

## 2019-06-04 NOTE — NUR
ASSUMED CARES AT 1920. ALERT AND ORIENTED. PLEASANT. DENIED ANY PAIN OR
FURTHER NAUSEA. USED URINAL AND RN EMPTIED. DID NOT GET UP OVERNIGHT. SLEPT
OFF AND ON. CALL LIGHT IN REACH AND BED ALARM ON.

## 2019-06-04 NOTE — NUR
ASSUMED CARE AT 0730.  ALERT ORIENTED PLEASANT COOPERATIVE BUT AFFECT SEEMS
FLAT.  HX OF CVA L SIDE WEAKNESS.  PT. IS EATING A GOOD BREAKFAST THIS A.M.
HAS BEEN ON TOILET X 2 GAVE A BISACODYL SUPPOSITORY WITH ONLY SMALL RESULTS
FORMED STOOL.  DID C/O NAUSEA AFTER BM ZOFRAN 4 MG. GIVEN.  TRANSFERS ARE
BETTER TODAY MIN MOD ASSIST G BELT WALKER AND A FEW STEPS FROM TOILET TO W/C.
TAKES MEDS WITHOUT DIFFICULTY 1 AT A TIME.  USES CALL LIGHT APPROPRIATELY.

## 2019-06-05 VITALS — DIASTOLIC BLOOD PRESSURE: 94 MMHG | SYSTOLIC BLOOD PRESSURE: 114 MMHG

## 2019-06-05 VITALS — SYSTOLIC BLOOD PRESSURE: 116 MMHG | DIASTOLIC BLOOD PRESSURE: 75 MMHG

## 2019-06-05 NOTE — NUR
Team conference held and team discussed pt to possibly dc today after one more
family training session with pt son and pt managing a full flight of stairs at
once.  When SW and Dr Canales met with pt to review this plan, pt expressed
inability to eat much of his lunch and intermitent nausea.  Dr Canales was
concerned about pt not able to eat, nausea and constipation.  Dr Canales did
not write dc orders as planned due to pt expression of above issues.  Therapy
worked with pt and pt son on stairs and pt did really well with stairs
however.  Then pt was feeling better this afternoon, possible for pt to be
able to dc home tomorrow, Thursday, 6/6. SW to continue to follow to assist
with finalizing safe dc plan for pt to dc home with son's care.  services to
follow as well.

## 2019-06-05 NOTE — NUR
ASSUMED CARE AT 1920. ALERT AND ORIENTED. PLEASANT. DENIED ANY NEED FOR
ANTI NAUSEA MED. USED URINAL OVERNIGHT AND NURSING EMPTIED. SLEPT WELL. CALL
LIGHT IN REACH. BED ALARM ON.

## 2019-06-05 NOTE — NUR
PATIENT WAS OFFERED ZOFRAN FOR NAUSEA AND DECLINED.  ONE SM BM TODAY. MIRLAX
ORDERED AND STARTED TODAY. DENIES PAIN OR DISCOMFORT. NO SIGN OF DISTRESS.

## 2019-06-06 VITALS — SYSTOLIC BLOOD PRESSURE: 140 MMHG | DIASTOLIC BLOOD PRESSURE: 82 MMHG

## 2019-06-06 VITALS — DIASTOLIC BLOOD PRESSURE: 82 MMHG | SYSTOLIC BLOOD PRESSURE: 140 MMHG

## 2019-06-06 NOTE — NUR
ASSUMMED CARE OF PT AT 0730, PT ALERT AND ORIENTED, FORGETFUL AT TIMES, PT
TRANSFERS WITH ASSIST OF 1 GB WALKER CUEING, AMBULATES TO BATHROOM, LARGE BM X
1 THIS SHIFT, COMPLAINS OF BACK PAIN, LIDOCAINE PATCH APPLIED, TAKING FOOD AND
FLUIDS WELL, PARTICIPATED IN ALL THERAPIES, HOURLY ROUNDING COMPLETED,
ASSESSMENT COMPLETE, ORDERS OBTAINED FOR DISCHARGE, PT AND SON EDUCATED ON
MEDICATIONS, FOLLOW UP APPTS, WHEN TO CALL PHYSICIAN, HOME HEALTH, ACTIVITY,
DIET, QUESTIONS ANSWERED, SCRIPTS CALLED TO PHARMACY, DISCHARGED PER W/C WITH
BELONIGNING TO MAIN ENTRANCE.

## 2019-06-06 NOTE — NUR
ASSUMED CARE 2 1920-6/5-WED.APPEARS SLEEPING IN BED W/ HOB UP.URINAL W/IN
REACH.AWAKENED @ 2030 FOR HS MEDS.TAKES ONE PILL @ A TIME W/ CUP OF H20.
WANTS ONLY SIDERAILS X3 UP.WEARS PULL UPS.SPEECH-SLURRED.ON HOURLY ROUNDS.
CNA DOING ODD HOUR ROUNDS.

## 2019-06-06 NOTE — NUR
SLEEPING SINCE 1920 & SLEPT GOOD ALL NIGHT.REFUSED HS SNACK.WEARS PULL UPS.
USED URINAL X2.NURSE EMPTIES URINAL @ NIGHT.ENCOURAGE FLUIDS 2L/DAY.NO NAUSEA
DURING NIGHT.

## 2019-06-06 NOTE — NUR
Pt to dc home today with HH services to follow. ABIGAIL spoke with both pt and pt
son about safe dc plan and pt ready to dc today medically as well.  Pt/family
preference for [facility] HH services; ABIGAIL spoke with intake at [facility] and faxed final
orders and med list.  Pt son to provide pt ride home.

## 2019-06-17 ENCOUNTER — HOSPITAL ENCOUNTER (INPATIENT)
Dept: HOSPITAL 96 - M.ERS | Age: 84
LOS: 7 days | Discharge: NURSING, REHAB OR LONG-TERM CARE | DRG: 641 | End: 2019-06-24
Attending: INTERNAL MEDICINE | Admitting: INTERNAL MEDICINE
Payer: COMMERCIAL

## 2019-06-17 VITALS — DIASTOLIC BLOOD PRESSURE: 57 MMHG | SYSTOLIC BLOOD PRESSURE: 109 MMHG

## 2019-06-17 VITALS — WEIGHT: 125 LBS | HEIGHT: 65.98 IN | BODY MASS INDEX: 20.09 KG/M2

## 2019-06-17 VITALS — SYSTOLIC BLOOD PRESSURE: 132 MMHG | DIASTOLIC BLOOD PRESSURE: 67 MMHG

## 2019-06-17 DIAGNOSIS — E86.0: Primary | ICD-10-CM

## 2019-06-17 DIAGNOSIS — K59.00: ICD-10-CM

## 2019-06-17 DIAGNOSIS — K40.90: ICD-10-CM

## 2019-06-17 DIAGNOSIS — D64.9: ICD-10-CM

## 2019-06-17 DIAGNOSIS — E78.5: ICD-10-CM

## 2019-06-17 DIAGNOSIS — I10: ICD-10-CM

## 2019-06-17 DIAGNOSIS — K63.89: ICD-10-CM

## 2019-06-17 DIAGNOSIS — Z79.899: ICD-10-CM

## 2019-06-17 DIAGNOSIS — K57.30: ICD-10-CM

## 2019-06-17 DIAGNOSIS — Z87.01: ICD-10-CM

## 2019-06-17 DIAGNOSIS — Z91.81: ICD-10-CM

## 2019-06-17 DIAGNOSIS — E11.9: ICD-10-CM

## 2019-06-17 DIAGNOSIS — A08.4: ICD-10-CM

## 2019-06-17 DIAGNOSIS — I69.351: ICD-10-CM

## 2019-06-17 DIAGNOSIS — Z79.82: ICD-10-CM

## 2019-06-17 DIAGNOSIS — K64.4: ICD-10-CM

## 2019-06-17 DIAGNOSIS — Z85.46: ICD-10-CM

## 2019-06-17 LAB
ABSOLUTE BASOPHILS: 0.1 THOU/UL (ref 0–0.2)
ABSOLUTE EOSINOPHILS: 0.1 THOU/UL (ref 0–0.7)
ABSOLUTE MONOCYTES: 0.7 THOU/UL (ref 0–1.2)
ALBUMIN SERPL-MCNC: 3.2 G/DL (ref 3.4–5)
ALP SERPL-CCNC: 86 U/L (ref 46–116)
ALT SERPL-CCNC: 24 U/L (ref 30–65)
ANION GAP SERPL CALC-SCNC: 8 MMOL/L (ref 7–16)
APTT BLD: 29.8 SECONDS (ref 25–31.3)
AST SERPL-CCNC: 15 U/L (ref 15–37)
BASOPHILS NFR BLD AUTO: 0.7 %
BILIRUB SERPL-MCNC: 0.3 MG/DL
BILIRUB UR-MCNC: NEGATIVE MG/DL
BUN SERPL-MCNC: 19 MG/DL (ref 7–18)
CALCIUM SERPL-MCNC: 8.5 MG/DL (ref 8.5–10.1)
CHLORIDE SERPL-SCNC: 104 MMOL/L (ref 98–107)
CO2 SERPL-SCNC: 31 MMOL/L (ref 21–32)
COLOR UR: YELLOW
CREAT SERPL-MCNC: 1.1 MG/DL (ref 0.6–1.3)
EOSINOPHIL NFR BLD: 0.7 %
GLUCOSE SERPL-MCNC: 109 MG/DL (ref 70–99)
GRANULOCYTES NFR BLD MANUAL: 72.5 %
HCT VFR BLD CALC: 37.3 % (ref 42–52)
HGB BLD-MCNC: 12.4 GM/DL (ref 14–18)
INR PPP: 1
KETONES UR STRIP-MCNC: NEGATIVE MG/DL
LYMPHOCYTES # BLD: 1.3 THOU/UL (ref 0.8–5.3)
LYMPHOCYTES NFR BLD AUTO: 16.7 %
MCH RBC QN AUTO: 29 PG (ref 26–34)
MCHC RBC AUTO-ENTMCNC: 33.2 G/DL (ref 28–37)
MCV RBC: 87.3 FL (ref 80–100)
MONOCYTES NFR BLD: 9.4 %
MPV: 7.8 FL. (ref 7.2–11.1)
NEUTROPHILS # BLD: 5.7 THOU/UL (ref 1.6–8.1)
NUCLEATED RBCS: 0 /100WBC
PLATELET COUNT*: 276 THOU/UL (ref 150–400)
POTASSIUM SERPL-SCNC: 3.4 MMOL/L (ref 3.5–5.1)
PROT SERPL-MCNC: 6.8 G/DL (ref 6.4–8.2)
PROT UR QL STRIP: NEGATIVE
PROTHROMBIN TIME: 10.6 SECONDS (ref 9.2–11.5)
RBC # BLD AUTO: 4.27 MIL/UL (ref 4.5–6)
RBC # UR STRIP: NEGATIVE /UL
RDW-CV: 15.2 % (ref 10.5–14.5)
SODIUM SERPL-SCNC: 143 MMOL/L (ref 136–145)
SP GR UR STRIP: <= 1.005 (ref 1–1.03)
TROPONIN-I LEVEL: <0.06 NG/ML (ref ?–0.06)
URINE CLARITY: CLEAR
URINE GLUCOSE-RANDOM: NEGATIVE
URINE LEUKOCYTES-REFLEX: NEGATIVE
URINE NITRITE-REFLEX: NEGATIVE
UROBILINOGEN UR STRIP-ACNC: 0.2 E.U./DL (ref 0.2–1)
WBC # BLD AUTO: 7.9 THOU/UL (ref 4–11)

## 2019-06-17 NOTE — PROC
[address]  [address]                    PROCEDURE REPORT              
_______________________________________________________________________________
 
Name:       GIANNA ALFREDO           Room:           [address] [place]  
M.R.#:  [id number]      Account #:      [id number]  
Admission:  06/17/19     Attend Phys:    Isabell Beltran MD 
Discharge:  06/24/19     Date of Birth:  [birth year]  
         Report #: [id number]
                                                                                
_______________________________________________________________________________
THIS REPORT FOR:  //name//                      
 
For GI report, please see the Provation report in Perceptive 7 content.
 
 
 
 
 
 
 
 
 
 
 
 
 
 
 
 
 
 
 
 
 
 
 
 
 
 
 
 
 
 
 
 
 
 
 
 
 
 
 
 
 
                       
                                        By:                                
                 
D: 06/21/19     _______________________________________
T: 06/27/19 0634Medical Records Staff TAMI       /AL

## 2019-06-17 NOTE — CON
[address]  [address]                    CONSULTATION                  
_______________________________________________________________________________
 
Name:       GIANNA ALFREDO           Room:           [address] [place]  
M.R.#:  [id number]      Account #:      [id number]  
Admission:  06/17/19     Attend Phys:    Isabell Beltran MD 
Discharge:               Date of Birth:  [birth year]  
         Report #: [id number]
                                                                     [id number]  
_______________________________________________________________________________
THIS REPORT FOR:  //name//                      
 
CC: Connor Beltran
 
DICTATED BY: [facility]
 
DATE OF SERVICE:  06/20/2019
 
 
Please note at the time of this dictation, the patient was seen and physically
examined by myself.
 
REASON FOR CONSULTATION:  Diarrhea and abnormal CT of the rectal wall
thickening.
 
HISTORY OF PRESENT ILLNESS:  This 85-year-old male presented to the Emergency
Room with increased dizziness and weakness, which he has been noticing for the
last couple of days.  He has also been having diarrhea for the last 3 days as
well.  He is really not complaining of any abdominal pain at the present time. 
He states he normally has issues with constipation that will last about 4 days
and then he will go with his diarrhea.  He has not noted any bright red blood or
any melanotic stool.  The patient recently was noted to have had a stroke back
in 2017.  The patient states he had a colonoscopy, but it was many years ago. 
He does not recall where or if there were any findings.
 
ALLERGIES:  No known drug allergies.
 
MEDICATIONS:  From home; Lipitor, senna and aspirin.
 
PAST MEDICAL HISTORY:  Hypertension, diabetes, hyperlipidemia, history of UTI,
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  He
has had pneumonia, status post prostate cancer with radiation, history of TIAs,
history of constipation, cervical spondylosis, seizures, peripheral neuropathy,
seasonal allergies, overactive bladder.
 
PAST SURGICAL HISTORY:  Negative.
 
FAMILY HISTORY:  Negative for any GI or female cancers.
 
SOCIAL HISTORY:  Denies any alcohol, tobacco or illegal drug use.
 
REVIEW OF SYSTEMS:  Twelve-point review of systems is essentially negative
except what is mentioned in the HPI.
 
 
 
 
[place] [address]                    CONSULTATION                  
_______________________________________________________________________________
 
Name:       GIANNA ALFREDO           Room:           [address] [place]  
..#:  [id number]      Account #:      [id number]  
Admission:  06/17/19     Attend Phys:    Isabell Beltran MD 
Discharge:               Date of Birth:  [birth year]  
         Report #: [id number]
                                                                     [id number]  
_______________________________________________________________________________
PHYSICAL EXAMINATION:
VITAL SIGNS:  Temperature 36.7, pulse 73, respirations 16, blood pressure
126/72.
HEART:  Regular rate and rhythm.
LUNGS:  Clear.
ABDOMEN:  Soft, positive bowel sounds in all 4 quadrants with no tenderness
noted to palpation.
 
LABORATORY DATA:  Hemoglobin is 11.8, white count is 6.2, platelets 226.  GFR is
107.  CT scan showed diverticulosis and rectal wall circumferential thickening
noted.
 
IMPRESSION:
1.  Diarrhea.
2.  Abnormal CT.  Rectal wall thickening.
3.  History of constipation.
4.  Weakness.
 
PLAN:
1.  Colonoscopy tomorrow with Dr. Choudhury.
2.  The patient will need a better bowel regimen prior to discharge to prevent
further recurrences of this.
3.  Further recommendations to be made once the procedure has been performed.
 
Thank you for allowing us to participate in this patient's care.  Please do not
hesitate to call with any questions in regard to this consult.
 
 
 
 
 
 
 
 
 
 
 
 
 
 
 
 
 
 
                       
                                        By:                                
                 
D: 06/20/19 1136_______________________________________
T: 06/20/19 1454Dayna Glass MD               /nt

## 2019-06-18 VITALS — DIASTOLIC BLOOD PRESSURE: 67 MMHG | SYSTOLIC BLOOD PRESSURE: 141 MMHG

## 2019-06-18 VITALS — SYSTOLIC BLOOD PRESSURE: 117 MMHG | DIASTOLIC BLOOD PRESSURE: 48 MMHG

## 2019-06-18 VITALS — DIASTOLIC BLOOD PRESSURE: 83 MMHG | SYSTOLIC BLOOD PRESSURE: 139 MMHG

## 2019-06-18 VITALS — SYSTOLIC BLOOD PRESSURE: 142 MMHG | DIASTOLIC BLOOD PRESSURE: 80 MMHG

## 2019-06-18 VITALS — DIASTOLIC BLOOD PRESSURE: 63 MMHG | SYSTOLIC BLOOD PRESSURE: 126 MMHG

## 2019-06-18 LAB
ANION GAP SERPL CALC-SCNC: 9 MMOL/L (ref 7–16)
BUN SERPL-MCNC: 11 MG/DL (ref 7–18)
CALCIUM SERPL-MCNC: 8 MG/DL (ref 8.5–10.1)
CHLORIDE SERPL-SCNC: 107 MMOL/L (ref 98–107)
CO2 SERPL-SCNC: 28 MMOL/L (ref 21–32)
CREAT SERPL-MCNC: 0.7 MG/DL (ref 0.6–1.3)
GLUCOSE SERPL-MCNC: 96 MG/DL (ref 70–99)
HCT VFR BLD CALC: 33.8 % (ref 42–52)
HGB BLD-MCNC: 11.4 GM/DL (ref 14–18)
MAGNESIUM SERPL-MCNC: 2 MG/DL (ref 1.8–2.4)
MCH RBC QN AUTO: 29.3 PG (ref 26–34)
MCHC RBC AUTO-ENTMCNC: 33.8 G/DL (ref 28–37)
MCV RBC: 86.9 FL (ref 80–100)
MPV: 8.1 FL. (ref 7.2–11.1)
PLATELET COUNT*: 249 THOU/UL (ref 150–400)
POTASSIUM SERPL-SCNC: 3.4 MMOL/L (ref 3.5–5.1)
RBC # BLD AUTO: 3.89 MIL/UL (ref 4.5–6)
RDW-CV: 15.6 % (ref 10.5–14.5)
SODIUM SERPL-SCNC: 144 MMOL/L (ref 136–145)
WBC # BLD AUTO: 6.6 THOU/UL (ref 4–11)

## 2019-06-18 NOTE — NUR
PT IS ABLE TO COMMUNICATE HIS NEEDS TO STAFF EFFECTIVELY. HE HAS DENIED THE
NEED FOR PAIN MEDICATION UP TO THIS TIME. HE WAS ADMITTED TO ROOM 218 DURING
NIGHT SHIFT TODAY; VSS, A+OX4, SR+PVCs ON TELEMETRY. POSSIBLE MRI OF THE HEAD
LATER TODAY. MD REQUESTED RETRIEVAL OF MED RECORDS FROM [facility].

## 2019-06-18 NOTE — NUR
RECEIVED REPORT FROM ADRIÁN MARTINEZ. ASSUMED CARE OF PT AROUND 0730. PT A&O X4,
FORGETFUL AT TIMES. RIGHT SIDE WEAKNESS AND SPEECH SLIGHTLY SLURRED FROM PAST
H/O CVA. VSS. CARDIAC MONITOR IN PLACE TRACING SR WITH PVC'S WITH NO CHANGES
THIS SHIFT. AM ASSESSMENT AND VITALS COMPLETED AS CHARTED. IV TO RIGHT FA
INTACT AND INFUSING IVF. PT WITH POOR APPETITE THIS SHIFT. FAMILY AT BEDSIDE
THIS AFTERNOON. PT INCONTINENT OF BOWEL THIS AFTERNOON - PT CLEANED.  PT USING
URINAL TO VOID, URINE LIGHT YELLOW. PT COMPLETED MRI'S THIS AM, SEE RESULTS.
PT REFUSED TO WORK WITH THERAPIES THIS MORNING, BUT WAS ABLE TO GET ONTO
BEDSIDE COMMODE AND UP TO BEDSIDE CHAIR WITH TELE STAFF LATER IN SHIFT. PT
CURRENTLY RESTING IN BED WATCHING TV. CALL LIGHT IS WITHIN REACH. FALL
PRECAUTIONS ARE IN PLACE. HOURLY ROUNDING PERFORMED. WCTM FOR DURATION OF
SHIFT.

## 2019-06-18 NOTE — NUR
Pt is A&O. Resides at home with wife. Known to this CM from previous hospital
stay. Pt recently dc from acute rehab on 6/6/19, per son, Pt was doing ok at
home, up until a few days ago, Pt began exhibiting "stroke like" symptons per
son. Pt has a RW and WC at home. Pt is current with [facility]. Hx of skilled at
[facility]. Discussed disposition, Pt wants to return to [facility] skilled at
dc. DC planner to fax initial referral. Informed Pt and family that Pt may be
in his copay days and required to prepay for services. CM to updated Pt/family
of cost once known. Anticipate dc within the next few days. Following.

## 2019-06-18 NOTE — EKG
[place] [address]
Phone:  [phone]                     ELECTROCARDIOGRAM REPORT      
_______________________________________________________________________________
 
Name:       GIANNA ALFREDO           Room:           [address]    [place]  
M.R.#:  [id number]      Account #:      [id number]  
Admission:  19     Attend Phys:    Isabell Beltran MD 
Discharge:               Date of Birth:  [birth year]  
         Report #: [id number]
    [id number]
_______________________________________________________________________________
THIS REPORT FOR:  //name//                      
 
                         [facility] ED
                                       
Test Date:    2019               Test Time:    14:48:39
Pat Name:     GIANNA ALFREDO            Department:   
Patient ID:   [id number]            Room:         [facility]
Gender:       M                        Technician:   
:          1933               Requested By: Andrei Cherry
Order Number: [id number] MD:   Sam Trammell
                                 Measurements
Intervals                              Axis          
Rate:         57                       P:            33
NV:           200                      QRS:          -22
QRSD:         104                      T:            47
QT:           403                                    
QTc:          393                                    
                           Interpretive Statements
Sinus rhythm
Ventricular trigeminy
Low voltage, precordial leads
Probable left ventricular hypertrophy
Baseline wander in lead(s) V2
Compared to ECG 2019 15:18:50
Left-axis deviation no longer present
ST (T wave) deviation no longer present
 
Electronically Signed On 2019 11:26:50 CDT by Sam Trammell
[email or web address]
 
 
 
 
 
 
 
 
 
 
 
 
 
 
  <ELECTRONICALLY SIGNED>
                                           By: Sam Trammell MD, FAC   
  19     1126
D: 19 1448   _____________________________________
T: 19 1448   Sam Trammell MD, [facility]     /EPI

## 2019-06-18 NOTE — NUR
D/C PLANNER INFORMED OF THE NEED TO FAX [facility] SKILLED REFERRAL.
D/C PLANNER FAXED HERBERT'S CLINICAL INFO, AND OT NOTE TO [facility]. AT THIS TIME
PATIENT HAS NOT BEEN SEEN BY PT. D/C PLANNER WILL FAX PT NOTE WHEN AVAILABLE.
CM WILL REMAIN AVIALABLE TO ASSIST AND FOLLOW AS NEEDED.

## 2019-06-19 VITALS — SYSTOLIC BLOOD PRESSURE: 117 MMHG | DIASTOLIC BLOOD PRESSURE: 56 MMHG

## 2019-06-19 VITALS — DIASTOLIC BLOOD PRESSURE: 63 MMHG | SYSTOLIC BLOOD PRESSURE: 127 MMHG

## 2019-06-19 VITALS — DIASTOLIC BLOOD PRESSURE: 57 MMHG | SYSTOLIC BLOOD PRESSURE: 129 MMHG

## 2019-06-19 VITALS — DIASTOLIC BLOOD PRESSURE: 51 MMHG | SYSTOLIC BLOOD PRESSURE: 118 MMHG

## 2019-06-19 VITALS — DIASTOLIC BLOOD PRESSURE: 73 MMHG | SYSTOLIC BLOOD PRESSURE: 136 MMHG

## 2019-06-19 VITALS — SYSTOLIC BLOOD PRESSURE: 140 MMHG | DIASTOLIC BLOOD PRESSURE: 77 MMHG

## 2019-06-19 LAB
ANION GAP SERPL CALC-SCNC: 11 MMOL/L (ref 7–16)
BUN SERPL-MCNC: 7 MG/DL (ref 7–18)
CALCIUM SERPL-MCNC: 8.3 MG/DL (ref 8.5–10.1)
CHLORIDE SERPL-SCNC: 109 MMOL/L (ref 98–107)
CO2 SERPL-SCNC: 25 MMOL/L (ref 21–32)
CREAT SERPL-MCNC: 0.7 MG/DL (ref 0.6–1.3)
GLUCOSE SERPL-MCNC: 97 MG/DL (ref 70–99)
POTASSIUM SERPL-SCNC: 3.4 MMOL/L (ref 3.5–5.1)
SODIUM SERPL-SCNC: 145 MMOL/L (ref 136–145)

## 2019-06-19 NOTE — NUR
ASSUMED CARE OF PATIENT THIS AM AT 0730.  PATIENT IS ALERT AND ORIENTED X 4.
HE DENIED PAIN THIS AM.  HE DOES C/O NAUSEA AND DIZZINESS.  PATIENT MEDICATED
FOR NAUSEA X 1 WITH GOOD RESULTS.  PATIENT ASSISTED UP TO THE CHAIR AND WITH
ADLS THROUGHOUT THE DAY.   TELE SHOWS SR TO SB WITH [facility].  PLANS FOR POSSIBLE
DISCHARGE TOMMORROW.

## 2019-06-19 NOTE — NUR
PT IS ABLE TO COMMUNICATE HIS NEEDS TO STAFF EFFECTIVELY. HE HAS DENIED THE
NEED FOR PAIN MEDICATION UP TO THIS TIME.

## 2019-06-20 VITALS — DIASTOLIC BLOOD PRESSURE: 68 MMHG | SYSTOLIC BLOOD PRESSURE: 121 MMHG

## 2019-06-20 VITALS — DIASTOLIC BLOOD PRESSURE: 66 MMHG | SYSTOLIC BLOOD PRESSURE: 134 MMHG

## 2019-06-20 VITALS — DIASTOLIC BLOOD PRESSURE: 72 MMHG | SYSTOLIC BLOOD PRESSURE: 126 MMHG

## 2019-06-20 VITALS — DIASTOLIC BLOOD PRESSURE: 92 MMHG | SYSTOLIC BLOOD PRESSURE: 128 MMHG

## 2019-06-20 VITALS — SYSTOLIC BLOOD PRESSURE: 118 MMHG | DIASTOLIC BLOOD PRESSURE: 63 MMHG

## 2019-06-20 VITALS — DIASTOLIC BLOOD PRESSURE: 57 MMHG | SYSTOLIC BLOOD PRESSURE: 124 MMHG

## 2019-06-20 LAB
ALBUMIN SERPL-MCNC: 2.8 G/DL (ref 3.4–5)
ALP SERPL-CCNC: 73 U/L (ref 46–116)
ALT SERPL-CCNC: 23 U/L (ref 30–65)
ANION GAP SERPL CALC-SCNC: 7 MMOL/L (ref 7–16)
AST SERPL-CCNC: 15 U/L (ref 15–37)
BILIRUB SERPL-MCNC: 0.3 MG/DL
BUN SERPL-MCNC: 13 MG/DL (ref 7–18)
CALCIUM SERPL-MCNC: 8.9 MG/DL (ref 8.5–10.1)
CHLORIDE SERPL-SCNC: 109 MMOL/L (ref 98–107)
CO2 SERPL-SCNC: 26 MMOL/L (ref 21–32)
CREAT SERPL-MCNC: 0.7 MG/DL (ref 0.6–1.3)
GLUCOSE SERPL-MCNC: 103 MG/DL (ref 70–99)
HCT VFR BLD CALC: 35.5 % (ref 42–52)
HGB BLD-MCNC: 11.8 GM/DL (ref 14–18)
MAGNESIUM SERPL-MCNC: 1.9 MG/DL (ref 1.8–2.4)
MCH RBC QN AUTO: 28.7 PG (ref 26–34)
MCHC RBC AUTO-ENTMCNC: 33.2 G/DL (ref 28–37)
MCV RBC: 86.4 FL (ref 80–100)
MPV: 8.1 FL. (ref 7.2–11.1)
PLATELET COUNT*: 226 THOU/UL (ref 150–400)
POTASSIUM SERPL-SCNC: 3.9 MMOL/L (ref 3.5–5.1)
PROT SERPL-MCNC: 6.2 G/DL (ref 6.4–8.2)
RBC # BLD AUTO: 4.11 MIL/UL (ref 4.5–6)
RDW-CV: 15.2 % (ref 10.5–14.5)
SODIUM SERPL-SCNC: 142 MMOL/L (ref 136–145)
WBC # BLD AUTO: 6.2 THOU/UL (ref 4–11)

## 2019-06-20 NOTE — NUR
Insurance requesting updated OT notes, informed rehab liaison. CM to fax to
[facility] once available.

## 2019-06-20 NOTE — NUR
PATINET RESTING IN BED.  UP WITH MAX ASSIST X2, WALKER, AND GAIT BELT.  AOX4.
HE IS TO HAVE A COLONOSCOPY TOMORROW, ORDERS RECEIVED. VITAL SIGNS STABLE AND
PATINET IN NOAPPARENT ISTRESS AT THIS TIME

## 2019-06-20 NOTE — NUR
PATIENT PROGRESSING TOWARDS GOALS: VSS ON ROOM AIR. PATIENT DENIES NAUSEA,
PAIN AND DISCOMFORT. DENIES DIZZINESS BUT CONTINUES TO HAVE WEAKNESS, BUT
PATIENT STATES THIS IS HIS BASELINE DUE TO RESIDUAL OF PREVIOUS CVA'S.
POSSIBLE DISCHARGE TODAY AFTER EEG. CALL LIGHT WITHIN REACH

## 2019-06-20 NOTE — NUR
Spoke with CHRISTOPHE Chopra to be consulted today. Pt should be ready to dc tomorrow. CM
updated Kayla at [facility], they continue to await insurance auth. Following.

## 2019-06-21 VITALS — DIASTOLIC BLOOD PRESSURE: 81 MMHG | SYSTOLIC BLOOD PRESSURE: 144 MMHG

## 2019-06-21 VITALS — SYSTOLIC BLOOD PRESSURE: 137 MMHG | DIASTOLIC BLOOD PRESSURE: 63 MMHG

## 2019-06-21 VITALS — SYSTOLIC BLOOD PRESSURE: 122 MMHG | DIASTOLIC BLOOD PRESSURE: 61 MMHG

## 2019-06-21 VITALS — DIASTOLIC BLOOD PRESSURE: 61 MMHG | SYSTOLIC BLOOD PRESSURE: 122 MMHG

## 2019-06-21 VITALS — DIASTOLIC BLOOD PRESSURE: 72 MMHG | SYSTOLIC BLOOD PRESSURE: 134 MMHG

## 2019-06-21 VITALS — SYSTOLIC BLOOD PRESSURE: 149 MMHG | DIASTOLIC BLOOD PRESSURE: 69 MMHG

## 2019-06-21 VITALS — SYSTOLIC BLOOD PRESSURE: 126 MMHG | DIASTOLIC BLOOD PRESSURE: 70 MMHG

## 2019-06-21 VITALS — SYSTOLIC BLOOD PRESSURE: 153 MMHG | DIASTOLIC BLOOD PRESSURE: 46 MMHG

## 2019-06-21 PROCEDURE — 0DBE8ZX EXCISION OF LARGE INTESTINE, VIA NATURAL OR ARTIFICIAL OPENING ENDOSCOPIC, DIAGNOSTIC: ICD-10-PCS | Performed by: INTERNAL MEDICINE

## 2019-06-21 NOTE — NUR
PATIENT PARTIALLY PROGRESSING TOWARDS GOALS FOR COMPLETION OF BOWEL PREP:
PATIENT FINISHED THE MIRALAX/GATORADE PREP AND HAS HAD NUMEROUS LIQUID,
SEMI-CLEAR STOOLS. PATIENT INCONTINENT OF MOST STOOLS. UNABLE TO TRANSFER TO
BEDSIDE COMMODE THIS SHIFT DUE TO WEAKNESS/FATIGUE/DIZZINESS. PATIENT ALSO
NAUSEOUS WITH BOWEL PREP AND HAD ONE EPISODE OF EMESIS. MEDICATED PER EMAR X1
WITH RELIEF. BARRIER CREAM APPLIED TO REDNESS ON BOTTOM. REDNESS REMAINS
BLANCHABLE. CALL LIGHT WITHIN REACH

## 2019-06-21 NOTE — NUR
PATIENT NOT RESTING IN BED.  UP WITH ASSIST X2 AND WALKER.  COLONOSCOPY
COMPLETED.  REGULAR DIET ORDER RECEIVED.  PATIENT AWAITING DISCHARGE TO SNF
WHEN INSURANCE AUTH.  MED SURG STATUS.

## 2019-06-22 VITALS — SYSTOLIC BLOOD PRESSURE: 158 MMHG | DIASTOLIC BLOOD PRESSURE: 68 MMHG

## 2019-06-22 VITALS — SYSTOLIC BLOOD PRESSURE: 101 MMHG | DIASTOLIC BLOOD PRESSURE: 53 MMHG

## 2019-06-22 VITALS — DIASTOLIC BLOOD PRESSURE: 53 MMHG | SYSTOLIC BLOOD PRESSURE: 99 MMHG

## 2019-06-22 VITALS — DIASTOLIC BLOOD PRESSURE: 75 MMHG | SYSTOLIC BLOOD PRESSURE: 136 MMHG

## 2019-06-22 LAB
ABSOLUTE BASOPHILS: 0 THOU/UL (ref 0–0.2)
ABSOLUTE EOSINOPHILS: 0 THOU/UL (ref 0–0.7)
ABSOLUTE MONOCYTES: 0.6 THOU/UL (ref 0–1.2)
ANION GAP SERPL CALC-SCNC: 9 MMOL/L (ref 7–16)
BASOPHILS NFR BLD AUTO: 0.6 %
BUN SERPL-MCNC: 16 MG/DL (ref 7–18)
CALCIUM SERPL-MCNC: 8.7 MG/DL (ref 8.5–10.1)
CHLORIDE SERPL-SCNC: 105 MMOL/L (ref 98–107)
CO2 SERPL-SCNC: 28 MMOL/L (ref 21–32)
CREAT SERPL-MCNC: 0.9 MG/DL (ref 0.6–1.3)
EOSINOPHIL NFR BLD: 0.6 %
GLUCOSE SERPL-MCNC: 156 MG/DL (ref 70–99)
GRANULOCYTES NFR BLD MANUAL: 72.7 %
HCT VFR BLD CALC: 38.5 % (ref 42–52)
HGB BLD-MCNC: 12.8 GM/DL (ref 14–18)
LYMPHOCYTES # BLD: 1.3 THOU/UL (ref 0.8–5.3)
LYMPHOCYTES NFR BLD AUTO: 18 %
MAGNESIUM SERPL-MCNC: 2 MG/DL (ref 1.8–2.4)
MCH RBC QN AUTO: 28.9 PG (ref 26–34)
MCHC RBC AUTO-ENTMCNC: 33.3 G/DL (ref 28–37)
MCV RBC: 86.7 FL (ref 80–100)
MONOCYTES NFR BLD: 8.1 %
MPV: 8.1 FL. (ref 7.2–11.1)
NEUTROPHILS # BLD: 5.4 THOU/UL (ref 1.6–8.1)
NUCLEATED RBCS: 0 /100WBC
PLATELET COUNT*: 233 THOU/UL (ref 150–400)
POTASSIUM SERPL-SCNC: 3.5 MMOL/L (ref 3.5–5.1)
RBC # BLD AUTO: 4.44 MIL/UL (ref 4.5–6)
RDW-CV: 15.4 % (ref 10.5–14.5)
SODIUM SERPL-SCNC: 142 MMOL/L (ref 136–145)
WBC # BLD AUTO: 7.5 THOU/UL (ref 4–11)

## 2019-06-22 NOTE — NUR
PATINET RESTING IN BED.  VITAL SIGNS STABLE AND PATINET IN NO APPARNET SIGNS
OF DISTRESS.  ERPORTED NAUSEA THIS MORNING, WELL TREATED WITH PO ZOFRAN.
HOURLY ROUNDING COMPLETD FOR PATIENT SAFETY.  UP WITH ASSSIST X2, WALKER, AND
GAIT BELT.

## 2019-06-22 NOTE — NUR
Following for d/c planning needs.  Spoke with admissions coordinator at [facility] and she has not received insurance authorization.

## 2019-06-22 NOTE — NUR
PATIENT PROGRESSING TOWARDS GOALS: VSS ON ROOM AIR. PATIENT DENIES PAIN AND
DISCOMFORT. NO CONCERNS THIS SHIFT. ANTICIPATING DISCHARGE WITH INSURANCE
AUTH. CALL LIGHT WITHIN REACH.

## 2019-06-23 VITALS — SYSTOLIC BLOOD PRESSURE: 120 MMHG | DIASTOLIC BLOOD PRESSURE: 67 MMHG

## 2019-06-23 VITALS — DIASTOLIC BLOOD PRESSURE: 67 MMHG | SYSTOLIC BLOOD PRESSURE: 130 MMHG

## 2019-06-23 VITALS — SYSTOLIC BLOOD PRESSURE: 122 MMHG | DIASTOLIC BLOOD PRESSURE: 54 MMHG

## 2019-06-23 VITALS — DIASTOLIC BLOOD PRESSURE: 55 MMHG | SYSTOLIC BLOOD PRESSURE: 127 MMHG

## 2019-06-23 LAB
ANION GAP SERPL CALC-SCNC: 10 MMOL/L (ref 7–16)
BUN SERPL-MCNC: 20 MG/DL (ref 7–18)
CALCIUM SERPL-MCNC: 8.6 MG/DL (ref 8.5–10.1)
CHLORIDE SERPL-SCNC: 104 MMOL/L (ref 98–107)
CO2 SERPL-SCNC: 29 MMOL/L (ref 21–32)
CREAT SERPL-MCNC: 0.8 MG/DL (ref 0.6–1.3)
GLUCOSE SERPL-MCNC: 102 MG/DL (ref 70–99)
POTASSIUM SERPL-SCNC: 3.8 MMOL/L (ref 3.5–5.1)
SODIUM SERPL-SCNC: 143 MMOL/L (ref 136–145)

## 2019-06-23 NOTE — NUR
RAJESH SAEED NBED WITH FAMILY AT BEDSIDE.  VITAL SIGNS STABLE ANDPAITENT
IN NO APPARENT SIGND OF DISTRESS.  HOURLY ROUNDING COMPLETED FOR PATIENT
SAFETY.  AWAITING INSURANCE AUTH FOR DISCHARGE TO SKILLED NURSING FACILITY.

## 2019-06-23 NOTE — NUR
PATIENT PROGRESSING TOWARDS GOALS: PENDING INSURANCE AUTH FOR DISCHARGE. VSS
ON ROOM AIR. RECEIVED TYLENOL FOR SHOULDER PAIN WITH RELIEF. PATIENT ABLE TO
SLEEP THROUGHOUT SHIFT. CALL LIGHT WITHIN REACH

## 2019-06-23 NOTE — NUR
RECEIVED REPORT AND ASSUMED CARE OF PT, ASSESSMENT COMPLETED. PT PLEASANT AND
COOPERATIVE. MOVES SELF IN BED AND WITH ASSIST FOR COMFORT. WILL CONT TO
MONITOR AND ASSIST AS NEEDED.

## 2019-06-24 VITALS — DIASTOLIC BLOOD PRESSURE: 79 MMHG | SYSTOLIC BLOOD PRESSURE: 134 MMHG

## 2019-06-24 VITALS — SYSTOLIC BLOOD PRESSURE: 112 MMHG | DIASTOLIC BLOOD PRESSURE: 67 MMHG

## 2019-06-24 VITALS — SYSTOLIC BLOOD PRESSURE: 134 MMHG | DIASTOLIC BLOOD PRESSURE: 79 MMHG

## 2019-06-24 NOTE — NUR
RECEIVED CALL FROM [facility], THEY HAVE INSURANCE AUTH AND CAN ACCEPT PT
TODAY.  PT AND SON/ARTURO UPDATED OVER THE PHONE.  FAXED DC ORDERS TO [facility].
[facility] SET UP W/C VAN FOR 3PM.  CHART COPIED AND RN  HAS NUMBER TO CALL REPORT

## 2019-06-24 NOTE — NUR
SLEPT WELL TONIGHT. ASSESSMENT UNCHANGED. ASSISTED WITH TURNING IN BED. USING
URINAL WITHOUT DIFFICULTY. HS GOALS OF REST AND SAFETY ACHIEVED. HOURLY
ROUNDING OBSERVED.

## 2019-06-24 NOTE — NUR
ORDER RECEIVED TO DISCHARGE PATIENT TO [facility] SKILLED NURSING.  MED
REC, MEDICATION EDUATION, STROKE EDUCATION, AND NEED FOR FOLLOW UP
APPOINTMENTS WITH HIS PRIMARY DOCTOR COVERED AND STATED AS UNDERSTOOD BY
PATIENT.  IV REMOVED.  HOURLY ROUNDING FOR PATIENT SAFETY COMPLETEXD DURING
THIS STAY.  TRANSPORTER ARRIVED WITH WHEELCHAIR TO TRANPORT PATIENT TO [facility].  PATIET IN NOAPPARENT SIGNS OF DISTRESS AT TIME OF DISCHARGE.
DISCHARGE TIME OF 14:45.

## 2019-06-24 NOTE — PATH
[facility] 
[address] [place]  [address]                    PATHOLOGY RPT PROCEDURE       
_______________________________________________________________________________
 
Name:       JOE ALFREDO           Room:           [address] [place]  
M.R.#:  [id number]      Account #:      [id number]  
Admission:  06/17/19     Date of Birth:  [birth year]  
Discharge:                             Report #:    [id number]
                                                         Path Case #: [id number]
_______________________________________________________________________________
 
LCA Accession Number: [id number]
.                                                                01
Material submitted:                                        .
colon - RANDOM COLON BIOPSIES FOR CHRONIC DIARRHEA
.                                                                01
Clinical history:                                          .
None provided.
.                                                                02
**********************************************************************
Diagnosis:
Colonic mucosa "random colon biopsies":
- Features consistent with microscopic colitis.
(SHA:radhika; 06/24/2019)
QMS/06/24/2019
**********************************************************************
.                                                                02
Comment:
The colonic mucosa reveals increased intraepithelial lymphocytes with
fairly preserved architecture and increased lamina propria, lymphocytes
and plasma cells.  Diagnostic features of inflammatory bowel disease are
not seen.  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
.
History of diarrhea noted.
.
There is no evidence of atypia or malignancy.
(SHA:radhika; 06/24/2019)
.                                                                02
Electronically signed:                                     .
Oziel Pagan MD, Pathologist
NPI- [id number]
.                                                                01
Gross description:                                         .
Received in formalin labeled "Dony Joe, random colon biopsies for
chronic diarrhea" is a 1.3 x 0.5 x 0.2 cm aggregate of tan-brown mucosa
fragments. The specimen is submitted in A1. ([facility]; 6/23/2019)
SY/SY
.                                                                02
Pathologist provided ICD-10:
K52.839
.                                                                02
CPT                                                        .
082010
Specimen Comment: A courtesy copy of this report has been sent to
Specimen Comment: [phone], [phone], [phone].
Specimen Comment: Report sent to ,DR MACHUCA / DR PERALES
***Performed at:  01
   [address]  [id number] [place] [address]                    PATHOLOGY RPT PROCEDURE       
_______________________________________________________________________________
 
Name:       JOE ALFREDO           Room:           [address] [place]  
M.R.#:  [id number]      Account #:      [id number]  
Admission:  06/17/19     Date of Birth:  [birth year]  
Discharge:                             Report #:    [id number]
                                                         Path Case #: [id number]
_______________________________________________________________________________
   MD Mikhail Chaudhry MD Phone:  [phone]
***Performed at:  02
   [facility]
   [address], [place], [place]  [id number]
   MD Triston Wilson MD Phone:  [phone]

## 2019-06-25 NOTE — EEG
[address]  [address]                    EEG STUDY REPORT              
_______________________________________________________________________________
 
Name:       GIANNA ALFREDO           Room:           [address] [place]  
M.R.#:  [id number]      Account #:      [id number]  
Admission:  06/17/19     Attend Phys:    Isabell Beltran MD 
Discharge:  06/24/19     Date of Birth:  [birth year]  
         Report #: [id number]
                                                                     [id number]  
_______________________________________________________________________________
THIS REPORT FOR:  //name//                      
 
CC: Connor Beltran
 
DATE OF SERVICE:  06/20/2019
 
 
This patient is being evaluated for altered mental status.  EEG was done by
placing the electrode by standard 10-20 system of electrode placement.  Both
referential and sequential montages were used for recording.
 
The background activity in this patient's EEG is about 9 Hz and 30 microvolt. 
The patient became drowsy and that is associated with bilateral slowing and
vertex sharp waves.  Photic stimulation was unremarkable.
 
IMPRESSION:  The patient's EEG is intermixed with theta range slowing on both
sides.  That is a nonspecific abnormality, which can occur with dementia,
encephalopathy, effect of psychotropic medication, etc.  Clinical correlation is
recommended.
 
 
 
 
 
 
 
 
 
 
 
 
 
 
 
 
 
 
 
 
 
 
 
 
<ELECTRONICALLY SIGNED>
                                        By:  Wilbert Mann MD         
06/25/19     1410
D: 06/20/19 1351_______________________________________
T: 06/20/19 1407Wilbert Mann MD            /nt

## 2019-06-25 NOTE — CON
[address]  [address]                    CONSULTATION                  
_______________________________________________________________________________
 
Name:       SAYDAGIANNAJESSE KHAN           Room:           [address] [place]  
M.R.#:  [id number]      Account #:      [id number]  
Admission:  06/17/19     Attend Phys:    Isabell Beltran MD 
Discharge:  06/24/19     Date of Birth:  [birth year]  
         Report #: [id number]
                                                                     [id number]  
_______________________________________________________________________________
THIS REPORT FOR:  //name//                      
 
CC: Connor Beltran
 
DATE OF SERVICE:  06/18/2019
 
 
HISTORY OF PRESENT ILLNESS:  This is an 85-year-old male patient who does not
provide any reliable history.  I reviewed the patient's extensive records in the
computer.  In 2017, he was admitted with what looks like small lacunar
cerebrovascular accident on the left side.  Since then, he has been seen or
admitted with what looks like dizzy spells and somewhat nonspecific symptoms. 
When I asked him what he is admitted for, he says for dehydration.  He does have
a history of diarrhea and may have been dehydrated.  Previous to that, he has
been admitted with falls.  He sometimes slides out of his chair.  His MRI has
been repeated and another one was done today and that is not available and they
have indicated pretty extensive disease, which is chronic.
 
REVIEW OF SYSTEMS:  Indicate that there is intermittent deterioration of the
patient's symptoms for which no cause is found.  He becomes dizzy.  He is not
able to provide any history, but he has been to rehab.  I tried to carry out the
14-point review of systems in this patient.  It is all from the records because
he does not provide any reliable history.  He has been seen by multiple
physicians in the past and they include cardiologist.  At one time, he had
bradycardia.  He had multiple falls.  His speech looks slurred, but apparently
at least according to him, it is his baseline.  I do not know how his memory is.
 On examination today, his memory looks impaired.  He has a history of lumbar
radiculopathy, dysphagia, difficulty with speech, elevated blood sugar.  This is
all I can get from him about his 14-point review of systems.
 
PAST MEDICAL HISTORY:  Positive for stroke.  He also had some prostate
carcinoma.
 
FAMILY HISTORY:  Negative for early age stroke.
 
SOCIAL HISTORY:  He does not smoke or drink alcohol.
 
PHYSICAL EXAMINATION:  Indicates he is alert.  His speech is extremely
dysarthric.  He can tell me what month it is, but his memory looks impaired.  He
is partly oriented.  Cranial nerve examination II-XII was attempted, very
difficult to tell in this patient who has such profound speech problem.  I did
not see much focality.  He moves all 4 extremities.  There is no meningeal sign
in this patient.  There is no carotid bruit.  Pulses are difficult to feel.  He
has no edema, cyanosis or jaundice.  Cardiac examinations appear unremarkable. 
No respiratory difficulty was noticed.  Blood pressure is 142/80, pulse is 75
 
 
 
[place] [id number]                    CONSULTATION                  
_______________________________________________________________________________
 
Name:       GIANNA ALFREDO           Room:           [address] [place]  
..#:  [id number]      Account #:      [id number]  
Admission:  06/17/19     Attend Phys:    Isabell Beltran MD 
Discharge:  06/24/19     Date of Birth:  [birth year]  
         Report #: [id number]
                                                                     [id number]  
_______________________________________________________________________________
and temperature is 97.4.
 
LABORATORY DATA:  Indicates a white count of 6.6.  He did have a CT scan of the
head, which showed no acute changes.
 
IMPRESSION:  Very difficult to form in this patient.  It is possible that this
patient had another stroke.  It is also possible that this patient has developed
some seizures.  We will await the MRI.  I will get an EEG done and we will try
to talk to the son to see if anything else needs to be addressed on this
patient.
 
Thank you very much for this referral.  If you have any question, please feel
free to contact me.
 
 
 
 
 
 
 
 
 
 
 
 
 
 
 
 
 
 
 
 
 
 
 
 
 
 
 
 
 
 
 
<ELECTRONICALLY SIGNED>
                                        By:  Wilbert Mann MD         
06/25/19     1410
D: 06/18/19 1114_______________________________________
T: 06/18/19 1229Wilbert Mann MD            /nt

## 2023-06-28 NOTE — HPI: EVALUATION OF SKIN LESION(S)
How Severe Are Your Spot(S)?: mild
Have Your Spot(S) Been Treated In The Past?: has not been treated
Hpi Title: Evaluation of Skin Lesions
Year Removed: 1900
Suture Removal: 14 days